# Patient Record
Sex: FEMALE | URBAN - METROPOLITAN AREA
[De-identification: names, ages, dates, MRNs, and addresses within clinical notes are randomized per-mention and may not be internally consistent; named-entity substitution may affect disease eponyms.]

---

## 2022-08-01 ENCOUNTER — OFFICE VISIT (OUTPATIENT)
Dept: PAIN MANAGEMENT | Age: 42
End: 2022-08-01
Payer: COMMERCIAL

## 2022-08-01 DIAGNOSIS — M79.672 BILATERAL FOOT PAIN: Primary | ICD-10-CM

## 2022-08-01 DIAGNOSIS — M79.671 BILATERAL FOOT PAIN: Primary | ICD-10-CM

## 2022-08-01 PROCEDURE — 95911 NRV CNDJ TEST 9-10 STUDIES: CPT | Performed by: PHYSICAL MEDICINE & REHABILITATION

## 2022-08-01 PROCEDURE — 95886 MUSC TEST DONE W/N TEST COMP: CPT | Performed by: PHYSICAL MEDICINE & REHABILITATION

## 2022-08-01 NOTE — PROGRESS NOTES
Electromyography (EMG)/Nerve conduction studies (NCS) Report: Lower Extremity    Name: Diego Lara   YOB: 1980  Date of Service: 8/1/2022   Provider: Jessica Dsouza MD        INDICATIONS:  Diego Lara is a 36 y.o. female who presents for electrodiagnostic evaluation for neuropathy of foot by request of Dr. Claude Farrell. Her main symptom is bilateral foot pain. She denies any history of diabetes mellitus, thyroid disease, heavy alcohol use, liver or kidney problems, or history of chemotherapy. Both limbs are necessary to examine in order to evaluate for any evidence of systemic disease as well as establish normal baseline values from which to compare any abnormal unilateral findings. The study is explained and verbal consent to proceed is obtained. NERVE CONDUCTION STUDIES:    Sensory nerve conduction studies: Bilateral sural and superficial peroneal sensory nerve conduction studies demonstrate normal peak latencies and amplitudes. Waveforms are reproducible and good quality in all studies. Bilateral lower limb temperatures are normal.     Motor nerve conduction studies: Bilateral peroneal motor nerve conduction studies with pickup over the extensor digitorum brevis demonstrate normal distal latencies and amplitudes. Bilateral tibial motor nerve conduction studies with pickup over the abductor hallucis demonstrate normal distal latencies and amplitudes. H reflex: Bilateral H reflexes are difficult to elicit. ELECTROMYOGRAPHY: This study is limited due to patient tolerance. A disposable monopolar needle is used to evaluate bilateral vastus medialis, tibialis anterior, extensor hallucis longus, flexor digitorum longus, peroneus longus, medial gastrocnemius, and lateral gastrocnemius. Tolerance limits sampling in the left medial gastrocnemius, left lateral gastrocnemius, and right vastus medialis.   All of the muscles sampled are free of any clear abnormal spontaneous

## 2023-06-30 DIAGNOSIS — M19.90 ARTHRITIS: Primary | ICD-10-CM

## 2023-06-30 PROBLEM — M54.50 LOWER BACK PAIN: Status: ACTIVE | Noted: 2023-06-30

## 2023-06-30 PROBLEM — M13.0 INFLAMMATION OF MULTIPLE JOINTS: Status: ACTIVE | Noted: 2023-06-30

## 2023-06-30 PROBLEM — D75.839 THROMBOCYTOSIS: Status: ACTIVE | Noted: 2023-06-30

## 2023-06-30 PROBLEM — G62.9 NEUROPATHY: Status: ACTIVE | Noted: 2023-06-30

## 2023-06-30 PROBLEM — F41.0 PANIC ANXIETY SYNDROME: Status: ACTIVE | Noted: 2023-06-30

## 2023-06-30 PROBLEM — R19.7 DIARRHEA, UNSPECIFIED: Status: ACTIVE | Noted: 2023-06-30

## 2023-06-30 PROBLEM — G43.909 MIGRAINE: Status: ACTIVE | Noted: 2023-06-30

## 2023-06-30 PROBLEM — E55.9 VITAMIN D DEFICIENCY: Status: ACTIVE | Noted: 2023-06-30

## 2023-06-30 PROBLEM — G89.29 CHRONIC INTRACTABLE HEADACHE: Status: ACTIVE | Noted: 2023-06-30

## 2023-06-30 PROBLEM — H05.20 OCULAR PROPTOSIS: Status: ACTIVE | Noted: 2023-06-30

## 2023-06-30 PROBLEM — B34.9 NONSPECIFIC SYNDROME SUGGESTIVE OF VIRAL ILLNESS: Status: ACTIVE | Noted: 2023-06-30

## 2023-06-30 PROBLEM — R20.2 PINS AND NEEDLES SENSATION: Status: ACTIVE | Noted: 2023-06-30

## 2023-06-30 PROBLEM — D64.9 ANEMIA: Status: ACTIVE | Noted: 2023-06-30

## 2023-06-30 PROBLEM — R51.9 CHRONIC INTRACTABLE HEADACHE: Status: ACTIVE | Noted: 2023-06-30

## 2023-06-30 PROBLEM — M79.672 LEFT FOOT PAIN: Status: ACTIVE | Noted: 2023-06-30

## 2023-06-30 PROBLEM — M54.2 NECK PAIN: Status: ACTIVE | Noted: 2023-06-30

## 2023-06-30 PROBLEM — R20.2 PARESTHESIA: Status: ACTIVE | Noted: 2023-06-30

## 2023-06-30 PROBLEM — H53.9 VISUAL DISTURBANCE: Status: ACTIVE | Noted: 2023-06-30

## 2023-06-30 PROBLEM — Z20.822 SUSPECTED COVID-19 VIRUS INFECTION: Status: ACTIVE | Noted: 2023-06-30

## 2023-06-30 PROBLEM — S93.609A FOOT SPRAIN: Status: ACTIVE | Noted: 2023-06-30

## 2023-06-30 PROBLEM — H65.92 LEFT SEROUS OTITIS MEDIA: Status: ACTIVE | Noted: 2023-06-30

## 2023-06-30 PROBLEM — M79.642 PAIN IN BOTH HANDS: Status: ACTIVE | Noted: 2023-06-30

## 2023-06-30 PROBLEM — M79.641 PAIN IN BOTH HANDS: Status: ACTIVE | Noted: 2023-06-30

## 2023-06-30 PROBLEM — G43.009 MIGRAINE WITHOUT AURA AND WITHOUT STATUS MIGRAINOSUS, NOT INTRACTABLE: Status: ACTIVE | Noted: 2023-06-30

## 2023-06-30 PROBLEM — E53.8 LOW SERUM VITAMIN B12: Status: ACTIVE | Noted: 2023-06-30

## 2023-06-30 PROBLEM — G57.90 NEUROPATHY OF FOOT: Status: ACTIVE | Noted: 2023-06-30

## 2023-06-30 PROBLEM — R06.02 SHORTNESS OF BREATH: Status: ACTIVE | Noted: 2023-06-30

## 2023-06-30 PROBLEM — R10.84 ABDOMINAL CRAMPING, GENERALIZED: Status: ACTIVE | Noted: 2023-06-30

## 2023-06-30 PROBLEM — R90.89 ABNORMAL BRAIN MRI: Status: ACTIVE | Noted: 2023-06-30

## 2023-06-30 RX ORDER — DOXYCYCLINE HYCLATE 100 MG
100 TABLET ORAL EVERY 12 HOURS
COMMUNITY
Start: 2023-01-23 | End: 2024-04-18 | Stop reason: ALTCHOICE

## 2023-06-30 RX ORDER — RIZATRIPTAN BENZOATE 10 MG/1
TABLET, ORALLY DISINTEGRATING ORAL
COMMUNITY
Start: 2022-10-16 | End: 2024-02-19 | Stop reason: WASHOUT

## 2023-06-30 RX ORDER — FLUTICASONE PROPIONATE 50 MCG
2 SPRAY, SUSPENSION (ML) NASAL DAILY
COMMUNITY
Start: 2022-05-31 | End: 2024-02-19 | Stop reason: WASHOUT

## 2023-06-30 RX ORDER — ALLOPURINOL 100 MG/1
100 TABLET ORAL 3 TIMES DAILY
COMMUNITY
Start: 2022-11-15 | End: 2024-02-19 | Stop reason: WASHOUT

## 2023-06-30 RX ORDER — IBUPROFEN 800 MG/1
800 TABLET ORAL 2 TIMES DAILY
COMMUNITY
End: 2024-02-19 | Stop reason: WASHOUT

## 2023-06-30 RX ORDER — IBUPROFEN 800 MG/1
TABLET ORAL
Qty: 60 TABLET | Refills: 0 | Status: SHIPPED | OUTPATIENT
Start: 2023-06-30 | End: 2024-04-18 | Stop reason: SDUPTHER

## 2023-06-30 RX ORDER — CHOLECALCIFEROL (VITAMIN D3) 50 MCG
TABLET ORAL
COMMUNITY
End: 2024-04-18 | Stop reason: SDUPTHER

## 2023-06-30 NOTE — TELEPHONE ENCOUNTER
Please schedule an appointment for medicine is gone have sent in 30-day supply.  In the event that you cannot get in before this 30-day supply please call my office.Thanks much Dr. Lopez.

## 2023-07-11 LAB — COBALAMIN (VITAMIN B12) (PG/ML) IN SER/PLAS: 925 PG/ML (ref 211–911)

## 2023-10-31 ENCOUNTER — TELEPHONE (OUTPATIENT)
Dept: NEUROLOGY | Facility: CLINIC | Age: 43
End: 2023-10-31
Payer: COMMERCIAL

## 2023-10-31 NOTE — TELEPHONE ENCOUNTER
Patient called in requesting results from skin bx.    She is also inquiring about her paperwork (I did confirm that we received it and that it was on your desk). She is wanting to know if she can have the paperwork as soon as possible because she was supposed to turn it in last month but we did not receive it at that time. Thank you!

## 2023-11-16 ENCOUNTER — OFFICE VISIT (OUTPATIENT)
Dept: NEUROLOGY | Facility: CLINIC | Age: 43
End: 2023-11-16
Payer: COMMERCIAL

## 2023-11-16 DIAGNOSIS — G43.809 OTHER MIGRAINE WITHOUT STATUS MIGRAINOSUS, NOT INTRACTABLE: Primary | ICD-10-CM

## 2023-11-16 DIAGNOSIS — M79.7 FIBROMYALGIA: ICD-10-CM

## 2023-11-16 PROCEDURE — 99214 OFFICE O/P EST MOD 30 MIN: CPT | Performed by: PSYCHIATRY & NEUROLOGY

## 2023-11-16 RX ORDER — TOPIRAMATE 50 MG/1
TABLET, FILM COATED ORAL
Qty: 60 TABLET | Refills: 2 | Status: SHIPPED | OUTPATIENT
Start: 2023-11-16 | End: 2024-02-05 | Stop reason: SDUPTHER

## 2023-11-16 ASSESSMENT — PATIENT HEALTH QUESTIONNAIRE - PHQ9
1. LITTLE INTEREST OR PLEASURE IN DOING THINGS: NOT AT ALL
SUM OF ALL RESPONSES TO PHQ9 QUESTIONS 1 AND 2: 0
2. FEELING DOWN, DEPRESSED OR HOPELESS: NOT AT ALL

## 2023-11-16 ASSESSMENT — ENCOUNTER SYMPTOMS
LOSS OF SENSATION IN FEET: 0
OCCASIONAL FEELINGS OF UNSTEADINESS: 0

## 2023-11-16 ASSESSMENT — COLUMBIA-SUICIDE SEVERITY RATING SCALE - C-SSRS
1. IN THE PAST MONTH, HAVE YOU WISHED YOU WERE DEAD OR WISHED YOU COULD GO TO SLEEP AND NOT WAKE UP?: NO
6. HAVE YOU EVER DONE ANYTHING, STARTED TO DO ANYTHING, OR PREPARED TO DO ANYTHING TO END YOUR LIFE?: NO
2. HAVE YOU ACTUALLY HAD ANY THOUGHTS OF KILLING YOURSELF?: NO

## 2023-11-16 NOTE — PROGRESS NOTES
Chief Complaint: Paresthesias    HPI  43 y.o. female presenting for follow up regarding paresthesias.    Since the last visit, she continues to note diffuse aching pain throughout her body.  She also notes burning or stabbing pains from the waist on downward.  Her feet feel like she is walking on broken glass.  She notes a diffuse spasm in her back.      She notes headaches about 2-3 per week.  She describes a throbbing/pressure pain either behind her eyes or in the back of her head.  She has associated photophobia, phonophobia, nausea, and vomiting.  She used to take Rizatriptan which did break her migraine.      She has tried Cymbalta which did not help.    She has tried Amitriptyine which caused to feel drowsy.        Current Outpatient Medications:     allopurinol (Zyloprim) 100 mg tablet, Take 1 tablet (100 mg) by mouth 3 times a day., Disp: , Rfl:     doxycycline (Vibra-Tabs) 100 mg tablet, Take 1 tablet (100 mg) by mouth every 12 hours., Disp: , Rfl:     fluticasone (Flonase) 50 mcg/actuation nasal spray, Administer 2 sprays into affected nostril(s) once daily., Disp: , Rfl:     ibuprofen 800 mg tablet, Take 1 tablet by mouth twice daily, Disp: 60 tablet, Rfl: 0    ibuprofen 800 mg tablet, Take 1 tablet (800 mg) by mouth 2 times a day., Disp: , Rfl:     rizatriptan MLT (Maxalt-MLT) 10 mg disintegrating tablet, dissolve 1 tablet ON TONGUE AT ONSET OF HEADACHE may repeat in 2 ...  (REFER TO PRESCRIPTION NOTES)., Disp: , Rfl:     Vitamin B-12 2,500 mcg tablet, sublingual, DISSOLVE 1 TABLET ONCE DAILY, Disp: , Rfl:       Objective:    Gen: NAD  Neuro:  --HIF: A&O X 3, repetition and naming intact  --CN:  PERRLA, EOMI, VFF, no visible facial asymmetry, facial sensation intact, no tongue or palatal deviation, SCM intact  --Motor: Moves all 4 extremities equally; no focal deficits  --Sensory: Intact to light touch, intact to pinprick  --Reflex: 2+ symmetric, toes down  --Cerebellum: FTN and HTS intact  --Gait:  Normal, narrow based gait    Relevant Results  Skin  Biopsy: Negative    Assessment:    Chronic Migraine  - current migraine frequency is 2-3 days/week  - she has tried Amitriptyline, Cymbalta, and Gabapentin without any improvement  - start Topamax and titrate to 50 mg BID (reviewed side effects; patient instructed to avoid pregnancy given increased risk of fetal malformation)  - keep migraine diary    2.  Fibromyalgia  - will try Topamax as above    Follow up in 3 months,      Santosh Pham MD  [facility]  Department of Neurology

## 2023-12-28 ENCOUNTER — TELEPHONE (OUTPATIENT)
Dept: NEUROLOGY | Facility: CLINIC | Age: 43
End: 2023-12-28
Payer: COMMERCIAL

## 2023-12-28 NOTE — LETTER
To Whom It May Concern,    I have seen and evaluated Bernohemy Plata.    Based on my evaluation, the patient should be allowed to sit down on an as needed basis during her 8 hour shift.  Please let me know if you have any questions.    Thank You,    Santosh Pham MD  [facility]  Department of Neurology

## 2023-12-28 NOTE — TELEPHONE ENCOUNTER
----- Message from Caren Henderson MA sent at 12/28/2023 10:35 AM EST -----  Patient would like to know if you can rewrite her letter for work that instead of saying she needs to sit down the entire 8hours for it to say as needed through out the 8 hours

## 2024-02-05 DIAGNOSIS — G43.809 OTHER MIGRAINE WITHOUT STATUS MIGRAINOSUS, NOT INTRACTABLE: ICD-10-CM

## 2024-02-05 RX ORDER — TOPIRAMATE 50 MG/1
TABLET, FILM COATED ORAL
Qty: 60 TABLET | Refills: 2 | Status: SHIPPED | OUTPATIENT
Start: 2024-02-05 | End: 2024-02-19 | Stop reason: SDUPTHER

## 2024-02-06 ENCOUNTER — APPOINTMENT (OUTPATIENT)
Dept: RHEUMATOLOGY | Facility: CLINIC | Age: 44
End: 2024-02-06
Payer: COMMERCIAL

## 2024-02-19 ENCOUNTER — OFFICE VISIT (OUTPATIENT)
Dept: NEUROLOGY | Facility: CLINIC | Age: 44
End: 2024-02-19
Payer: COMMERCIAL

## 2024-02-19 VITALS
BODY MASS INDEX: 25.02 KG/M2 | TEMPERATURE: 96.8 F | SYSTOLIC BLOOD PRESSURE: 120 MMHG | WEIGHT: 155.7 LBS | HEIGHT: 66 IN | HEART RATE: 105 BPM | DIASTOLIC BLOOD PRESSURE: 82 MMHG

## 2024-02-19 DIAGNOSIS — G43.809 OTHER MIGRAINE WITHOUT STATUS MIGRAINOSUS, NOT INTRACTABLE: ICD-10-CM

## 2024-02-19 DIAGNOSIS — M79.7 FIBROMYALGIA: Primary | ICD-10-CM

## 2024-02-19 PROCEDURE — 99214 OFFICE O/P EST MOD 30 MIN: CPT | Performed by: PSYCHIATRY & NEUROLOGY

## 2024-02-19 RX ORDER — TOPIRAMATE 50 MG/1
TABLET, FILM COATED ORAL
Qty: 90 TABLET | Refills: 3 | Status: SHIPPED | OUTPATIENT
Start: 2024-02-19

## 2024-02-19 RX ORDER — DULOXETIN HYDROCHLORIDE 30 MG/1
30 CAPSULE, DELAYED RELEASE ORAL DAILY
COMMUNITY
Start: 2023-11-24 | End: 2024-04-18 | Stop reason: ALTCHOICE

## 2024-02-19 ASSESSMENT — LIFESTYLE VARIABLES
AUDIT-C TOTAL SCORE: 0
HOW OFTEN DO YOU HAVE SIX OR MORE DRINKS ON ONE OCCASION: NEVER
SKIP TO QUESTIONS 9-10: 1
HOW OFTEN DO YOU HAVE A DRINK CONTAINING ALCOHOL: NEVER
HOW MANY STANDARD DRINKS CONTAINING ALCOHOL DO YOU HAVE ON A TYPICAL DAY: PATIENT DOES NOT DRINK

## 2024-02-19 ASSESSMENT — PATIENT HEALTH QUESTIONNAIRE - PHQ9
2. FEELING DOWN, DEPRESSED OR HOPELESS: NOT AT ALL
SUM OF ALL RESPONSES TO PHQ9 QUESTIONS 1 & 2: 0
1. LITTLE INTEREST OR PLEASURE IN DOING THINGS: NOT AT ALL

## 2024-02-19 NOTE — PROGRESS NOTES
"Chief Complaint: Migraines, Fibromyalgia    HPI  44 y.o. female presenting for follow up regarding above.    Since the last visit, she has been doing well. Her migraines are about once per week.  She also noted an improvement in her fibromyalgia symptoms.  Two weeks ago, she ate red meat.  She noted worsening diffuse pain  She notes a shooting pain in her feet, legs, back, arms.  She denies any side effects from Topamax.        Current Outpatient Medications:     doxycycline (Vibra-Tabs) 100 mg tablet, Take 1 tablet (100 mg) by mouth every 12 hours., Disp: , Rfl:     DULoxetine (Cymbalta) 30 mg DR capsule, Take 1 capsule (30 mg) by mouth once daily., Disp: , Rfl:     Vitamin B-12 2,500 mcg tablet, sublingual, DISSOLVE 1 TABLET ONCE DAILY, Disp: , Rfl:     ibuprofen 800 mg tablet, Take 1 tablet by mouth twice daily, Disp: 60 tablet, Rfl: 0    topiramate (Topamax) 50 mg tablet, Week 1: 25 mg/day.  Week 2:  25 mg BID.  Week 3:  25 mg in the morning, 50 mg in the evening.  Week 4 and beyond:  50 mg twice per day., Disp: 60 tablet, Rfl: 2      Objective:  /82 (BP Location: Right arm, Patient Position: Sitting, BP Cuff Size: Adult)   Pulse 105   Temp 36 °C (96.8 °F) (Temporal)   Ht 1.676 m (5' 6\")   Wt 70.6 kg (155 lb 11.2 oz)   LMP  (LMP Unknown)   BMI 25.13 kg/m²     Gen: NAD  Neuro:  --HIF: A&O X 3, repetition and naming intact  --CN:  PERRLA, EOMI, VFF, no visible facial asymmetry, facial sensation intact, no tongue or palatal deviation, SCM intact  --Motor: Moves all 4 extremities equally; no focal deficits  --Sensory: Intact to light touch, intact to pinprick  --Reflex: 2+ symmetric, toes down  --Cerebellum: FTN and HTS intact  --Gait: Normal, narrow based gait    Relevant Results      Assessment:    Chronic Migraines  - improved since starting Topamax  - current migraine frequency is up to once per week  - recommend increasing Topamax to 75 mg BID    2.  Fibromyalgia  - worse in the last 2 weeks  - " recommend increasing Topamax to 75 mg BID  - exercise daily    Follow up in 6 months      Santosh Pham MD  [facility]  Department of Neurology

## 2024-04-18 ENCOUNTER — OFFICE VISIT (OUTPATIENT)
Dept: PRIMARY CARE | Facility: CLINIC | Age: 44
End: 2024-04-18
Payer: COMMERCIAL

## 2024-04-18 ENCOUNTER — TELEPHONE (OUTPATIENT)
Dept: PRIMARY CARE | Facility: CLINIC | Age: 44
End: 2024-04-18

## 2024-04-18 VITALS
HEART RATE: 88 BPM | HEIGHT: 66 IN | RESPIRATION RATE: 18 BRPM | TEMPERATURE: 97 F | OXYGEN SATURATION: 96 % | DIASTOLIC BLOOD PRESSURE: 82 MMHG | BODY MASS INDEX: 25.71 KG/M2 | SYSTOLIC BLOOD PRESSURE: 130 MMHG | WEIGHT: 160 LBS

## 2024-04-18 DIAGNOSIS — M19.90 ARTHRITIS: ICD-10-CM

## 2024-04-18 DIAGNOSIS — E55.9 VITAMIN D DEFICIENCY: ICD-10-CM

## 2024-04-18 DIAGNOSIS — R55 SYNCOPE, UNSPECIFIED SYNCOPE TYPE: ICD-10-CM

## 2024-04-18 DIAGNOSIS — E53.8 LOW SERUM VITAMIN B12: Primary | ICD-10-CM

## 2024-04-18 DIAGNOSIS — J01.90 ACUTE RHINOSINUSITIS: ICD-10-CM

## 2024-04-18 DIAGNOSIS — K21.00 GASTROESOPHAGEAL REFLUX DISEASE WITH ESOPHAGITIS, UNSPECIFIED WHETHER HEMORRHAGE: ICD-10-CM

## 2024-04-18 PROBLEM — U07.1 COVID-19: Status: ACTIVE | Noted: 2021-12-27

## 2024-04-18 PROBLEM — S46.819A STRAIN OF TRAPEZIUS MUSCLE: Status: ACTIVE | Noted: 2019-06-18

## 2024-04-18 PROCEDURE — 99214 OFFICE O/P EST MOD 30 MIN: CPT | Performed by: FAMILY MEDICINE

## 2024-04-18 PROCEDURE — 93000 ELECTROCARDIOGRAM COMPLETE: CPT | Performed by: FAMILY MEDICINE

## 2024-04-18 RX ORDER — IBUPROFEN 800 MG/1
800 TABLET ORAL 2 TIMES DAILY
Qty: 60 TABLET | Refills: 0 | Status: SHIPPED | OUTPATIENT
Start: 2024-04-18

## 2024-04-18 RX ORDER — PREGABALIN 50 MG/1
CAPSULE ORAL
COMMUNITY

## 2024-04-18 RX ORDER — FLUTICASONE PROPIONATE 50 MCG
2 SPRAY, SUSPENSION (ML) NASAL DAILY
Qty: 16 G | Refills: 11 | Status: SHIPPED | OUTPATIENT
Start: 2024-04-18 | End: 2025-04-18

## 2024-04-18 RX ORDER — OMEPRAZOLE 40 MG/1
40 CAPSULE, DELAYED RELEASE ORAL
Qty: 30 CAPSULE | Refills: 11 | Status: SHIPPED | OUTPATIENT
Start: 2024-04-18 | End: 2024-05-18

## 2024-04-18 RX ORDER — CHOLECALCIFEROL (VITAMIN D3) 50 MCG
TABLET ORAL
Qty: 90 TABLET | Refills: 3 | Status: SHIPPED | OUTPATIENT
Start: 2024-04-18

## 2024-04-18 RX ORDER — LORATADINE 10 MG/1
10 TABLET ORAL DAILY
Qty: 30 TABLET | Refills: 2 | Status: SHIPPED | OUTPATIENT
Start: 2024-04-18 | End: 2024-07-17

## 2024-04-18 NOTE — ASSESSMENT & PLAN NOTE
v going to the bathroom when she passed  out felt    lighthheaded andc  dizzy and shortness of breath        had   pain left  leg

## 2024-04-18 NOTE — ASSESSMENT & PLAN NOTE
The reflux esophagitis he has been occurring in a recurrent pattern for years. The course has been without change. The reflux is described as mild to moderate. The patient remains compliant on meds.. The patient takes medications in daily fashion. Denies dysphagia hoarseness or odynophagia... Uncontrolled discussed with patient encouraged to try omeprazole 40 mg daily in addition watch coffee cola chocolate tea avoid eating prior to bedtime head of bed elevated at night for two 4 inch blocks under the headboard works and monitor.  Will refer for EGD check labs discussed medication and side effects

## 2024-04-18 NOTE — LETTER
April 18, 2024     Patient: Jason Plata   YOB: 1980   Date of Visit: 4/18/2024       To Whom It May Concern:    Jason Plata was seen in my clinic on 4/18/2024?. Please excuse Jason for her absence from work on this day to make the appointment.    If you have any questions or concerns, please don't hesitate to call.         Sincerely,         Stuart Lopez, DO

## 2024-04-18 NOTE — ASSESSMENT & PLAN NOTE
Patient seen on clinical chronic on/9/24 according to the medical records she was having ongoing sinus issues for about a month with COVID 2 years.  According to records she stood up to go to the bathroom felt lightheaded and passed out before chest pains at the time.    Examination shows congestion in the nose may be allergies could try loratadine 10 mg daily and fluticasone nasal spray see if this helps

## 2024-04-18 NOTE — PROGRESS NOTES
Subjective   Patient ID: Jason Plata is a 44 y.o. female who presents for Sinusitis (Declined swabs).  HPI  Patient seen on clinical chronic on/9/24 according to the medical records she was having ongoing sinus issues for about a month with COVID 2 years.  According to records she stood up to go to the bathroom felt lightheaded and passed out before chest pains at the time.  Patient had beginning of workup without left emergency department after short period time.  I.e. 4 hours  ?? Sinus   infection .. Using inhaler  and helping    . Sometimes     with inhaler use helps    The reflux esophagitis he has been occurring in a recurrent pattern for years. The course has been without change. The reflux is described as mild to moderate. The patient remains compliant on meds.. The patient takes medications in daily fashion. Denies dysphagia hoarseness or odynophagia...     Going to  pain mangement at [facility] and fibromyalgia  Review of Systems  cardiovascular:  no  palpitations or chest  pain  respiratory: sometimes  shortness  of  breath  endocrine:  no polydipsia,  no polyuria  musculoskeletal:  no  myalgia.. no arthralgia  All other  systems discussed  negative   Objective   Physical Exam  Vitals: I have reviewed the vitals  General: Well-developed.  In no acute distress.  Eyes:   sclera nonicteric.  Conjunctiva not injected.  No discharge.   HEAD: Normocephalic, atraumatic.  HEENT   Mucous membranes moist.  Posterior oropharynx nonerythematous, no tonsillar exudates.     Nose congested  No cervical lymphadenopathy.  Cardio: Regular rate and rhythm.  No murmur, rub or gallop.  Pulmonary: Lungs clear to auscultation in all fields.  No accessory muscle use.  GI/: Normal active bowel sounds.  Soft, nontender.  No masses or organomegaly appreciated.  Musculoskeletal: No gross deformities appreciated.  Neuro: Alert, age-appropriate.  Normal muscle tone.  Moving all extremities.  Skin: No rash, bruises or  lesions.   Labs      [facility]  Outside Information      suggestion  Information displayed in this report may not trend or trigger automated decision support.      Contains abnormal data CBC + DIFF  Order: [id number]  Component  Ref Range & Units 9 d ago   WBC  3.70 - 11.00 k/uL 10.96   RBC  3.90 - 5.20 m/uL 4.40   Hemoglobin  11.5 - 15.5 g/dL 12.1   Hematocrit  36.0 - 46.0 % 38.3   MCV  80.0 - 100.0 fL 87.0   MCH  26.0 - 34.0 pg 27.5   MCHC  30.5 - 36.0 g/dL 31.6   RDW-CV  11.5 - 15.0 % 15.0   Platelet Count  150 - 400 k/uL 419 High    MPV  9.0 - 12.7 fL 8.9 Low    Neutrophils %  % 59.0   Abs Neut  1.45 - 7.50 k/uL 6.46   Lymphocytes %  % 28.2   Abs Lymph  1.00 - 4.00 k/uL 3.09   Monocytes %  % 9.3   Abs Mono  <0.87 k/uL 1.02 High    Eosinophils %  % 2.2   Abs Eosin  <0.46 k/uL 0.24   Basophils %  % 0.6   Abs Baso  <0.11 k/uL 0.07   Immature Granulocytes %  % 0.7   Abs Immature Gran  <0.10 k/uL 0.08   NRBC  /100 WBC 0.0   Absolute nRBC  <0.01 k/uL <0.01   Diff Type Auto       [facility]  Outside Information      suggestion  Information displayed in this report may not trend or trigger automated decision support.      Contains abnormal data COMP METABOLIC PANEL  Order: [id number]  Component  Ref Range & Units 9 d ago   Protein, Total  6.3 - 8.0 g/dL 8.2 High    Albumin  3.9 - 4.9 g/dL 4.2   Calcium, Total  8.5 - 10.2 mg/dL 9.4   Bilirubin, Total  0.2 - 1.3 mg/dL <0.2 Low    Alkaline Phosphatase  34 - 123 U/L 82   AST  13 - 35 U/L 20   ALT  7 - 38 U/L 18   Glucose  74 - 99 mg/dL 96   Comment: The American Diabetes Association (ADA) provides guidance for cutoff values for fasting glucose and random glucose. The ADA defines fasting as no caloric intake for at least 8 hours. Fasting plasma glucose results between 100 to 125 mg/dL indicate increased risk for diabetes (prediabetes).  Fasting plasma glucose results greater than or equal to 126 mg/dL meet the criteria for diagnosis of diabetes. In the absence of  unequivocal hyperglycemia, results should be confirmed by repeat testing. In a patient with classic symptoms of hyperglycemia or hyperglycemic crisis, random plasma glucose results greater than or equal to 200 mg/dL meet the criteria for diagnosis of diabetes.  Reference: Standards of Medical Care in Diabetes 2016, American Diabetes Association. Diabetes Care. 2016.39(Suppl 1).   BUN  7 - 21 mg/dL 8   Creatinine  0.58 - 0.96 mg/dL 0.84   Sodium  136 - 144 mmol/L 139   Potassium  3.7 - 5.1 mmol/L 4.1   Chloride  97 - 105 mmol/L 106 High    CO2  22 - 30 mmol/L 21 Low    Anion Gap  9 - 18 mmol/L 12   Estimated Glomerular Filtration Rate  >=60 mL/min/1.73m² 87   Comment: Estimated Glomerular Filtration Rate (eGFR) is calculated using the 2021 CKD-EPI creatinine equation. This equation utilizes serum creatinine, sex, and age as parameters. The creatinine assay has traceable calibration to isotope dilution-mass spectrometry. Refer to KDIGO guidelines for clinical interpretation. In patients with unstable renal function, e.g. those with acute kidney injury, the eGFR may not accurately reflect actual GFR.   Resulting Agency [facility] LABOR[facility]   MAGNESIUM BLD  Order: [id number]  Component  Ref Range & Units 9 d ago   Magnesium  1.7 - 2.3 mg/dL 2.0   Resulting [facility]     Specimen Collected: 04/09/24 13:42    Performed by: [facility] Last Resulted: 04/09/24 14:30   Received From: [facility]  Result Received: 04/18/24 08:44    View Encounter        Received Information   [facility]  Outside Information      suggestion  Information displayed in this report may not trend or trigger automated decision support.     HIGH SENSITIVITY TROPONIN T (INITIAL)  Order: [id number]  Component  Ref Range & Units 9 d ago   LEE High Sensitivity  <12 ng/L 7         Assessment/Plan   Problem List Items Addressed This Visit       Vitamin D deficiency      vitamin D vitamin D 2000 IUs daily          Relevant Orders    Vitamin D 25-Hydroxy,Total (for eval of Vitamin D levels)    Low serum vitamin B12 - Primary    Relevant Medications    Vitamin B-12 2,500 mcg tablet, sublingual SL tablet    Acute rhinosinusitis     Patient seen on clinical chronic on/9/24 according to the medical records she was having ongoing sinus issues for about a month with COVID 2 years.  According to records she stood up to go to the bathroom felt lightheaded and passed out before chest pains at the time.    Examination shows congestion in the nose may be allergies could try loratadine 10 mg daily and fluticasone nasal spray see if this helps         Relevant Medications    fluticasone (Flonase) 50 mcg/actuation nasal spray    loratadine (Claritin) 10 mg tablet    Reflux esophagitis      The reflux esophagitis he has been occurring in a recurrent pattern for years. The course has been without change. The reflux is described as mild to moderate. The patient remains compliant on meds.. The patient takes medications in daily fashion. Denies dysphagia hoarseness or odynophagia... Uncontrolled discussed with patient encouraged to try omeprazole 40 mg daily in addition watch coffee cola chocolate tea avoid eating prior to bedtime head of bed elevated at night for two 4 inch blocks under the headboard works and monitor.  Will refer for EGD check labs discussed medication and side effects         Relevant Medications    omeprazole (PriLOSEC) 40 mg DR capsule     Other Visit Diagnoses       Arthritis        Relevant Medications    ibuprofen 800 mg tablet    Syncope, unspecified syncope type        Relevant Orders    ECG 12 Lead    Referral to Cardiology        EKG showed normal sinus rhythm

## 2024-04-19 NOTE — PATIENT INSTRUCTIONS

## 2024-04-23 ENCOUNTER — LAB (OUTPATIENT)
Dept: LAB | Facility: LAB | Age: 44
End: 2024-04-23
Payer: COMMERCIAL

## 2024-04-23 DIAGNOSIS — E55.9 VITAMIN D DEFICIENCY: ICD-10-CM

## 2024-04-23 LAB — 25(OH)D3 SERPL-MCNC: 37 NG/ML (ref 30–100)

## 2024-04-23 PROCEDURE — 36415 COLL VENOUS BLD VENIPUNCTURE: CPT

## 2024-04-23 PROCEDURE — 82306 VITAMIN D 25 HYDROXY: CPT

## 2024-05-01 ENCOUNTER — OFFICE VISIT (OUTPATIENT)
Dept: CARDIOLOGY | Facility: CLINIC | Age: 44
End: 2024-05-01
Payer: COMMERCIAL

## 2024-05-01 VITALS
HEART RATE: 76 BPM | HEIGHT: 67 IN | BODY MASS INDEX: 25.62 KG/M2 | DIASTOLIC BLOOD PRESSURE: 70 MMHG | SYSTOLIC BLOOD PRESSURE: 128 MMHG | WEIGHT: 163.2 LBS

## 2024-05-01 DIAGNOSIS — R07.89 OTHER CHEST PAIN: ICD-10-CM

## 2024-05-01 DIAGNOSIS — Z82.49 FAMILY HISTORY OF ISCHEMIC HEART DISEASE: ICD-10-CM

## 2024-05-01 DIAGNOSIS — R00.2 PALPITATIONS: ICD-10-CM

## 2024-05-01 DIAGNOSIS — R06.09 DYSPNEA ON EXERTION: ICD-10-CM

## 2024-05-01 DIAGNOSIS — R55 VASOVAGAL SYNCOPE: ICD-10-CM

## 2024-05-01 DIAGNOSIS — R55 SYNCOPE, UNSPECIFIED SYNCOPE TYPE: ICD-10-CM

## 2024-05-01 DIAGNOSIS — F17.200 CURRENT SMOKER ON SOME DAYS: ICD-10-CM

## 2024-05-01 PROCEDURE — 93000 ELECTROCARDIOGRAM COMPLETE: CPT | Performed by: INTERNAL MEDICINE

## 2024-05-01 PROCEDURE — 3008F BODY MASS INDEX DOCD: CPT | Performed by: INTERNAL MEDICINE

## 2024-05-01 PROCEDURE — 99204 OFFICE O/P NEW MOD 45 MIN: CPT | Performed by: INTERNAL MEDICINE

## 2024-05-01 NOTE — PATIENT INSTRUCTIONS
You will be scheduled for a nuclear walking stress test, echocardiogram and 24 hour Holter monitor  Patient to follow up after testing.    Continue same medications/treatment.  Patient educated on proper medication use.  Patient educated on risk factor modification.  Please bring any lab results from other providers / physicians to your next appointment.    Please bring all medicines, vitamins and herbal supplements with you when you come to the office.    Prescriptions will not be filled unless you are compliant with your follow up appointments or have a follow up  appointment scheduled as per instruction of your physician.  Refills should be requested at the time of  Your visit.    Marta SUTTON LPN, am scribing for and in the presence of Dr. John Cohen MD, FACC

## 2024-05-01 NOTE — PROGRESS NOTES
"CARDIOLOGY OFFICE VISIT      CHIEF COMPLAINT  Chest discomfort    HISTORY OF PRESENT ILLNESS  The patient states that she went to be seen today because of recent chest discomfort.  Her chest discomfort started in March but is more bothersome recently.  The chest discomfort is anterior chest in location.  There is no radiation.  The patient states she has a tightness like feeling.  The episodes seem to last hours when she gets it.  She is very concerned about these episodes.  She states that there is a strong family history of cardiac problems particularly congestive heart failure.  One of her sisters has had cardiac surgery.  She does not know the exact time.  One of the family members apparently  from my \"heart attack\".  She does have a history of reflux esophagitis and fibromyalgia.  She does complain of dyspnea and fatigue with activities.  She also complains of heart racing and fluttering.  On the shoulder Eclyp[employer] day she did have a syncopal episode.  This sounds like is probably related to postural hypotension.  This is the only syncopal episode she has had.  She does smoke cigarettes.  She does not have any past history of hypertension, hyperlipidemia or diabetes mellitus.  She did have CBC and chemistry profile done approximately 3 weeks ago at [facility] and these are unremarkable.  I do not have any lipid profile on her.    EKG: Normal sinus rhythm, within normal limits, results discussed with patient    Impression:  Chest discomfort, evaluate for cardiac etiology  Dyspnea on exertion  Palpitations  1 recent syncopal episode, sounds like most likely secondary to postural hypotension  History of gastroesophageal reflux  History of fibromyalgia  Tobacco abuse  Positive family history of myocardial infarction  Positive family history of cardiac surgery, exact type not known    Please excuse any errors in grammar or translation related to this dictation.  Voice recognition software was utilized to prepare this " document.    Past Medical History  Past Medical History:   Diagnosis Date    Pain in left foot 04/24/2022    Left foot pain       Social History  Social History     Tobacco Use    Smoking status: Some Days     Current packs/day: 0.50     Types: Cigarettes     Passive exposure: Current    Smokeless tobacco: Not on file   Vaping Use    Vaping status: Never Used   Substance Use Topics    Alcohol use: Not Currently    Drug use: Never       Family History     Family History   Problem Relation Name Age of Onset    Diabetes Father          Allergies:  No Known Allergies     Outpatient Medications:  Current Outpatient Medications   Medication Instructions    fluticasone (Flonase) 50 mcg/actuation nasal spray 2 sprays, Each Nostril, Daily, Shake gently. Before first use, prime pump. After use, clean tip and replace cap.    ibuprofen 800 mg, oral, 2 times daily    loratadine (CLARITIN) 10 mg, oral, Daily    omeprazole (PRILOSEC) 40 mg, oral, Daily before breakfast, Do not crush or chew.    pregabalin (Lyrica) 50 mg capsule TAKE 1 CAPSULE BY MOUTH THREE TIMES DAILY FOR 90 DAYS    topiramate (Topamax) 50 mg tablet 75 mg BID    Vitamin B-12 2,500 mcg tablet, sublingual SL tablet DISSOLVE 1 TABLET ONCE DAILY          REVIEW OF SYSTEMS  Review of Systems   Cardiovascular:  Positive for chest pain.   All other systems reviewed and are negative.        VITALS  Vitals:    05/01/24 1244   BP: 128/70   Pulse: 76       PHYSICAL EXAM  Vitals and nursing note reviewed.   Constitutional:       Appearance: Healthy appearance.   Eyes:      Conjunctiva/sclera: Conjunctivae normal.      Pupils: Pupils are equal, round, and reactive to light.   Pulmonary:      Effort: Pulmonary effort is normal.      Breath sounds: Normal breath sounds.   Cardiovascular:      PMI at left midclavicular line. Normal rate. Regular rhythm.      Murmurs: There is no murmur.      No gallop.  No click. No rub.   Pulses:     Intact distal pulses.   Edema:      Peripheral edema absent.   Musculoskeletal: Normal range of motion. Skin:     General: Skin is warm and dry.   Neurological:      Mental Status: Alert and oriented to person, place and time.           ASSESSMENT AND PLAN  Diagnoses and all orders for this visit:  Syncope, unspecified syncope type  -     Referral to Cardiology  Other chest pain  Dyspnea on exertion  Vasovagal syncope  Palpitations  Family history of ischemic heart disease  BMI 25.0-25.9,adult  Current smoker on some days      [unfilled]

## 2024-05-09 ENCOUNTER — TELEPHONE (OUTPATIENT)
Dept: CARDIOLOGY | Facility: CLINIC | Age: 44
End: 2024-05-09
Payer: COMMERCIAL

## 2024-05-09 DIAGNOSIS — R06.09 DYSPNEA ON EXERTION: ICD-10-CM

## 2024-05-09 DIAGNOSIS — R07.89 OTHER CHEST PAIN: ICD-10-CM

## 2024-05-09 NOTE — TELEPHONE ENCOUNTER
----- Message from Paty Zaldivar sent at 5/9/2024  1:33 PM EDT -----  Regarding: MPL DENIED  MPL DENIED AFTER P2P COMPLETED. SUGGESTED GXT. PLEASE HAVE CDO ADVISE IF GXT NEEDED.    THANK YOU

## 2024-05-13 ENCOUNTER — ANCILLARY PROCEDURE (OUTPATIENT)
Dept: CARDIOLOGY | Facility: CLINIC | Age: 44
End: 2024-05-13
Payer: COMMERCIAL

## 2024-05-13 DIAGNOSIS — R55 SYNCOPE, UNSPECIFIED SYNCOPE TYPE: ICD-10-CM

## 2024-05-13 DIAGNOSIS — R00.2 PALPITATIONS: ICD-10-CM

## 2024-05-13 PROCEDURE — 93225 XTRNL ECG REC<48 HRS REC: CPT | Performed by: INTERNAL MEDICINE

## 2024-05-13 PROCEDURE — 93227 XTRNL ECG REC<48 HR R&I: CPT | Performed by: INTERNAL MEDICINE

## 2024-05-15 ENCOUNTER — APPOINTMENT (OUTPATIENT)
Dept: RADIOLOGY | Facility: CLINIC | Age: 44
End: 2024-05-15
Payer: COMMERCIAL

## 2024-05-15 ENCOUNTER — APPOINTMENT (OUTPATIENT)
Dept: CARDIOLOGY | Facility: CLINIC | Age: 44
End: 2024-05-15
Payer: COMMERCIAL

## 2024-05-17 ENCOUNTER — HOSPITAL ENCOUNTER (OUTPATIENT)
Dept: CARDIOLOGY | Facility: CLINIC | Age: 44
Discharge: HOME | End: 2024-05-17
Payer: COMMERCIAL

## 2024-05-17 VITALS
WEIGHT: 163 LBS | SYSTOLIC BLOOD PRESSURE: 110 MMHG | DIASTOLIC BLOOD PRESSURE: 60 MMHG | BODY MASS INDEX: 25.58 KG/M2 | HEIGHT: 67 IN

## 2024-05-17 DIAGNOSIS — R06.09 DYSPNEA ON EXERTION: ICD-10-CM

## 2024-05-17 DIAGNOSIS — R07.89 OTHER CHEST PAIN: ICD-10-CM

## 2024-05-17 DIAGNOSIS — R55 SYNCOPE, UNSPECIFIED SYNCOPE TYPE: ICD-10-CM

## 2024-05-17 DIAGNOSIS — R00.2 PALPITATIONS: ICD-10-CM

## 2024-05-17 LAB
AORTIC VALVE MEAN GRADIENT: 4 MMHG
AORTIC VALVE PEAK VELOCITY: 1.24 M/S
AV PEAK GRADIENT: 6.2 MMHG
AVA (PEAK VEL): 1.56 CM2
AVA (VTI): 1.56 CM2
EJECTION FRACTION APICAL 4 CHAMBER: 51.7
LEFT VENTRICLE INTERNAL DIMENSION DIASTOLE: 4.1 CM (ref 3.5–6)
LEFT VENTRICULAR OUTFLOW TRACT DIAMETER: 1.6 CM
LV EJECTION FRACTION BIPLANE: 51 %
MITRAL VALVE E/A RATIO: 1.27
MITRAL VALVE E/E' RATIO: 7.4
RIGHT VENTRICLE PEAK SYSTOLIC PRESSURE: 16.8 MMHG

## 2024-05-17 PROCEDURE — 93306 TTE W/DOPPLER COMPLETE: CPT | Performed by: INTERNAL MEDICINE

## 2024-05-17 PROCEDURE — 93306 TTE W/DOPPLER COMPLETE: CPT

## 2024-05-21 ENCOUNTER — HOSPITAL ENCOUNTER (OUTPATIENT)
Dept: CARDIOLOGY | Facility: CLINIC | Age: 44
Discharge: HOME | End: 2024-05-21
Payer: COMMERCIAL

## 2024-05-21 DIAGNOSIS — R06.00 DYSPNEA, UNSPECIFIED: ICD-10-CM

## 2024-05-21 DIAGNOSIS — R07.89 OTHER CHEST PAIN: ICD-10-CM

## 2024-05-21 DIAGNOSIS — R06.09 DYSPNEA ON EXERTION: ICD-10-CM

## 2024-05-21 PROCEDURE — 93017 CV STRESS TEST TRACING ONLY: CPT

## 2024-05-21 PROCEDURE — 93018 CV STRESS TEST I&R ONLY: CPT | Performed by: INTERNAL MEDICINE

## 2024-05-21 PROCEDURE — 93016 CV STRESS TEST SUPVJ ONLY: CPT | Performed by: INTERNAL MEDICINE

## 2024-05-28 ENCOUNTER — APPOINTMENT (OUTPATIENT)
Dept: CARDIOLOGY | Facility: CLINIC | Age: 44
End: 2024-05-28
Payer: COMMERCIAL

## 2024-05-29 ENCOUNTER — OFFICE VISIT (OUTPATIENT)
Dept: CARDIOLOGY | Facility: CLINIC | Age: 44
End: 2024-05-29
Payer: COMMERCIAL

## 2024-05-29 VITALS
DIASTOLIC BLOOD PRESSURE: 80 MMHG | BODY MASS INDEX: 25.47 KG/M2 | SYSTOLIC BLOOD PRESSURE: 120 MMHG | WEIGHT: 162.6 LBS | HEART RATE: 64 BPM

## 2024-05-29 DIAGNOSIS — F17.200 CURRENT SMOKER ON SOME DAYS: ICD-10-CM

## 2024-05-29 DIAGNOSIS — Z82.49 FAMILY HISTORY OF ISCHEMIC HEART DISEASE: ICD-10-CM

## 2024-05-29 DIAGNOSIS — R06.09 DYSPNEA ON EXERTION: ICD-10-CM

## 2024-05-29 DIAGNOSIS — R94.39 ABNORMAL STRESS TEST: ICD-10-CM

## 2024-05-29 DIAGNOSIS — I20.9 ANGINA, CLASS IV (CMS-HCC): Primary | ICD-10-CM

## 2024-05-29 PROCEDURE — 3008F BODY MASS INDEX DOCD: CPT | Performed by: INTERNAL MEDICINE

## 2024-05-29 PROCEDURE — 99214 OFFICE O/P EST MOD 30 MIN: CPT | Performed by: INTERNAL MEDICINE

## 2024-05-29 RX ORDER — NAPROXEN SODIUM 220 MG/1
81 TABLET, FILM COATED ORAL DAILY
Qty: 30 TABLET | Refills: 11 | OUTPATIENT
Start: 2024-05-29 | End: 2025-05-29

## 2024-05-29 NOTE — PATIENT INSTRUCTIONS
Continue same medications and treatments.   Patient educated on proper medication use.   Patient educated on risk factor modification.   Please bring any lab results from other providers / physicians to your next appointment.     Please bring all medicines, vitamins, and herbal supplements with you when you come to the office.     Prescriptions will not be filled unless you are compliant with your follow up appointments or have a follow up appointment scheduled as per instruction of your physician. Refills should be requested at the time of your visit.    LEFT HEART CATH POSSIBLE ORDERED, GIVE RANEXA 500 MG AND AMLODIPINE 2.5 MG ON ADMIT  START ASPIRIN 81 MG DAILY    IJenifer LPN, am scribing for and in the presence of Dr. John Cohen MD, FACC

## 2024-05-29 NOTE — PROGRESS NOTES
CARDIOLOGY OFFICE VISIT      CHIEF COMPLAINT      HISTORY OF PRESENT ILLNESS  The patient is being seen after initial evaluation on 5/1/2024.  The patient continues to be bothered by chest discomfort and dyspnea.  The chest discomfort is a retrosternal tightness like sensation.  There is no radiation of discomfort.  This can occur with exertion and at rest.  The patient is very concerned about this because of the strong family history of coronary disease myocardial infarction's and cardiac surgery.  I did go over her Holter monitor and echocardiogram with her.  These studies were unremarkable.  Her stress test was abnormal with significant ST depression consistent with myocardial ischemia.  I explained the results with her.  In view of her symptoms, risk factors, and positive family history of coronary artery disease I did recommend cardiac catheterization with possible PCI.  The procedure and risk were explained to the patient.  She agrees and wishes to proceed.      Impression:  Chest discomfort and dyspnea, new onset angina pectoris, CCS class IV with significant ST depression on stress test consistent with myocardial ischemia, recommend cardiac catheterization with PCI, patient agrees and wishes to proceed  Palpitations  1 recent syncopal episode, sounds like most likely secondary to postural hypotension  History of gastroesophageal reflux  History of fibromyalgia  Tobacco abuse  Positive family history of myocardial infarction  Positive family history of cardiac surgery, exact type not known     Please excuse any errors in grammar or translation related to this dictation.  Voice recognition software was utilized to prepare this document.    Past Medical History  Past Medical History:   Diagnosis Date    Pain in left foot 04/24/2022    Left foot pain       Social History  Social History     Tobacco Use    Smoking status: Some Days     Current packs/day: 0.50     Types: Cigarettes     Passive exposure: Current     Smokeless tobacco: Never   Vaping Use    Vaping status: Never Used   Substance Use Topics    Alcohol use: Not Currently    Drug use: Never       Family History     Family History   Problem Relation Name Age of Onset    Diabetes Father          Allergies:  No Known Allergies     Outpatient Medications:  Current Outpatient Medications   Medication Instructions    fluticasone (Flonase) 50 mcg/actuation nasal spray 2 sprays, Each Nostril, Daily, Shake gently. Before first use, prime pump. After use, clean tip and replace cap.    ibuprofen 800 mg, oral, 2 times daily    loratadine (CLARITIN) 10 mg, oral, Daily    omeprazole (PRILOSEC) 40 mg, oral, Daily before breakfast, Do not crush or chew.    pregabalin (Lyrica) 50 mg capsule TAKE 1 CAPSULE BY MOUTH THREE TIMES DAILY FOR 90 DAYS    topiramate (Topamax) 50 mg tablet 75 mg BID    Vitamin B-12 2,500 mcg tablet, sublingual SL tablet DISSOLVE 1 TABLET ONCE DAILY          REVIEW OF SYSTEMS  Review of Systems   All other systems reviewed and are negative.        VITALS  Vitals:    05/29/24 0752   BP: 120/80   Pulse: 64       PHYSICAL EXAM  Constitutional:       Appearance: Healthy appearance. Not in distress.   Neck:      Vascular: No JVR. JVD normal.   Pulmonary:      Effort: Pulmonary effort is normal.      Breath sounds: Normal breath sounds. No wheezing. No rhonchi. No rales.   Chest:      Chest wall: Not tender to palpatation.   Cardiovascular:      PMI at left midclavicular line. Normal rate. Regular rhythm. Normal S1. Normal S2.       Murmurs: There is no murmur.      No gallop.  No click. No rub.   Pulses:     Intact distal pulses.   Edema:     Peripheral edema absent.   Abdominal:      General: Bowel sounds are normal.      Palpations: Abdomen is soft.      Tenderness: There is no abdominal tenderness.   Musculoskeletal: Normal range of motion.         General: No tenderness. Skin:     General: Skin is warm and dry.   Neurological:      General: No focal deficit  present.      Mental Status: Alert and oriented to person, place and time.           ASSESSMENT AND PLAN      [unfilled]

## 2024-05-29 NOTE — H&P (VIEW-ONLY)
CARDIOLOGY OFFICE VISIT      CHIEF COMPLAINT      HISTORY OF PRESENT ILLNESS  The patient is being seen after initial evaluation on 5/1/2024.  The patient continues to be bothered by chest discomfort and dyspnea.  The chest discomfort is a retrosternal tightness like sensation.  There is no radiation of discomfort.  This can occur with exertion and at rest.  The patient is very concerned about this because of the strong family history of coronary disease myocardial infarction's and cardiac surgery.  I did go over her Holter monitor and echocardiogram with her.  These studies were unremarkable.  Her stress test was abnormal with significant ST depression consistent with myocardial ischemia.  I explained the results with her.  In view of her symptoms, risk factors, and positive family history of coronary artery disease I did recommend cardiac catheterization with possible PCI.  The procedure and risk were explained to the patient.  She agrees and wishes to proceed.      Impression:  Chest discomfort and dyspnea, new onset angina pectoris, CCS class IV with significant ST depression on stress test consistent with myocardial ischemia, recommend cardiac catheterization with PCI, patient agrees and wishes to proceed  Palpitations  1 recent syncopal episode, sounds like most likely secondary to postural hypotension  History of gastroesophageal reflux  History of fibromyalgia  Tobacco abuse  Positive family history of myocardial infarction  Positive family history of cardiac surgery, exact type not known     Please excuse any errors in grammar or translation related to this dictation.  Voice recognition software was utilized to prepare this document.    Past Medical History  Past Medical History:   Diagnosis Date    Pain in left foot 04/24/2022    Left foot pain       Social History  Social History     Tobacco Use    Smoking status: Some Days     Current packs/day: 0.50     Types: Cigarettes     Passive exposure: Current     Smokeless tobacco: Never   Vaping Use    Vaping status: Never Used   Substance Use Topics    Alcohol use: Not Currently    Drug use: Never       Family History     Family History   Problem Relation Name Age of Onset    Diabetes Father          Allergies:  No Known Allergies     Outpatient Medications:  Current Outpatient Medications   Medication Instructions    fluticasone (Flonase) 50 mcg/actuation nasal spray 2 sprays, Each Nostril, Daily, Shake gently. Before first use, prime pump. After use, clean tip and replace cap.    ibuprofen 800 mg, oral, 2 times daily    loratadine (CLARITIN) 10 mg, oral, Daily    omeprazole (PRILOSEC) 40 mg, oral, Daily before breakfast, Do not crush or chew.    pregabalin (Lyrica) 50 mg capsule TAKE 1 CAPSULE BY MOUTH THREE TIMES DAILY FOR 90 DAYS    topiramate (Topamax) 50 mg tablet 75 mg BID    Vitamin B-12 2,500 mcg tablet, sublingual SL tablet DISSOLVE 1 TABLET ONCE DAILY          REVIEW OF SYSTEMS  Review of Systems   All other systems reviewed and are negative.        VITALS  Vitals:    05/29/24 0752   BP: 120/80   Pulse: 64       PHYSICAL EXAM  Constitutional:       Appearance: Healthy appearance. Not in distress.   Neck:      Vascular: No JVR. JVD normal.   Pulmonary:      Effort: Pulmonary effort is normal.      Breath sounds: Normal breath sounds. No wheezing. No rhonchi. No rales.   Chest:      Chest wall: Not tender to palpatation.   Cardiovascular:      PMI at left midclavicular line. Normal rate. Regular rhythm. Normal S1. Normal S2.       Murmurs: There is no murmur.      No gallop.  No click. No rub.   Pulses:     Intact distal pulses.   Edema:     Peripheral edema absent.   Abdominal:      General: Bowel sounds are normal.      Palpations: Abdomen is soft.      Tenderness: There is no abdominal tenderness.   Musculoskeletal: Normal range of motion.         General: No tenderness. Skin:     General: Skin is warm and dry.   Neurological:      General: No focal deficit  present.      Mental Status: Alert and oriented to person, place and time.           ASSESSMENT AND PLAN      [unfilled]   complains of pain/discomfort

## 2024-06-04 ENCOUNTER — LAB (OUTPATIENT)
Dept: LAB | Facility: LAB | Age: 44
End: 2024-06-04
Payer: COMMERCIAL

## 2024-06-04 DIAGNOSIS — I20.9 ANGINA, CLASS IV (CMS-HCC): ICD-10-CM

## 2024-06-04 DIAGNOSIS — Z82.49 FAMILY HISTORY OF ISCHEMIC HEART DISEASE: ICD-10-CM

## 2024-06-04 DIAGNOSIS — R94.39 ABNORMAL STRESS TEST: ICD-10-CM

## 2024-06-04 LAB
ANION GAP SERPL CALC-SCNC: 12 MMOL/L (ref 10–20)
BUN SERPL-MCNC: 7 MG/DL (ref 6–23)
CALCIUM SERPL-MCNC: 8.9 MG/DL (ref 8.6–10.3)
CHLORIDE SERPL-SCNC: 106 MMOL/L (ref 98–107)
CO2 SERPL-SCNC: 24 MMOL/L (ref 21–32)
CREAT SERPL-MCNC: 1 MG/DL (ref 0.5–1.05)
EGFRCR SERPLBLD CKD-EPI 2021: 71 ML/MIN/1.73M*2
ERYTHROCYTE [DISTWIDTH] IN BLOOD BY AUTOMATED COUNT: 14.4 % (ref 11.5–14.5)
GLUCOSE SERPL-MCNC: 85 MG/DL (ref 74–99)
HCT VFR BLD AUTO: 35.7 % (ref 36–46)
HGB BLD-MCNC: 11.2 G/DL (ref 12–16)
MCH RBC QN AUTO: 27.5 PG (ref 26–34)
MCHC RBC AUTO-ENTMCNC: 31.4 G/DL (ref 32–36)
MCV RBC AUTO: 88 FL (ref 80–100)
NRBC BLD-RTO: 0 /100 WBCS (ref 0–0)
PLATELET # BLD AUTO: 445 X10*3/UL (ref 150–450)
POTASSIUM SERPL-SCNC: 4.3 MMOL/L (ref 3.5–5.3)
RBC # BLD AUTO: 4.08 X10*6/UL (ref 4–5.2)
SODIUM SERPL-SCNC: 138 MMOL/L (ref 136–145)
WBC # BLD AUTO: 8.9 X10*3/UL (ref 4.4–11.3)

## 2024-06-04 PROCEDURE — 80048 BASIC METABOLIC PNL TOTAL CA: CPT

## 2024-06-04 PROCEDURE — 36415 COLL VENOUS BLD VENIPUNCTURE: CPT

## 2024-06-04 PROCEDURE — 85027 COMPLETE CBC AUTOMATED: CPT

## 2024-06-05 ENCOUNTER — TELEPHONE (OUTPATIENT)
Dept: PRIMARY CARE | Facility: CLINIC | Age: 44
End: 2024-06-05
Payer: COMMERCIAL

## 2024-06-05 NOTE — LETTER
June 5, 2024     Patient: Jason Plata   YOB: 1980   Date of Visit: 6/5/2024       To Whom It May Concern:    Jason Plata was seen in my clinic on 04/18/2024. At that time I had previously excused her for this day; however Ms. Plata was seen this day due to her hospital visit 04/09/2024. Please excuse Jason for her absence from for the following absences 04/09/2024, 04/17/2024, & 04/18/2024.     Jason Plata 1980 is a compliant patient, and has been under my care. She is currently taking measures to get her health under control.       If you have any questions or concerns, please don't hesitate to call.         Sincerely,             Stuart Lopez, DO

## 2024-06-10 RX ORDER — METOPROLOL TARTRATE 25 MG/1
12.5 TABLET, FILM COATED ORAL ONCE
Status: CANCELLED | OUTPATIENT
Start: 2024-06-10

## 2024-06-11 ENCOUNTER — HOSPITAL ENCOUNTER (OUTPATIENT)
Facility: HOSPITAL | Age: 44
Setting detail: OUTPATIENT SURGERY
Discharge: HOME | End: 2024-06-11
Attending: INTERNAL MEDICINE | Admitting: INTERNAL MEDICINE
Payer: COMMERCIAL

## 2024-06-11 ENCOUNTER — HOSPITAL ENCOUNTER (OUTPATIENT)
Dept: CARDIOLOGY | Facility: HOSPITAL | Age: 44
Setting detail: OUTPATIENT SURGERY
Discharge: HOME | End: 2024-06-11
Payer: COMMERCIAL

## 2024-06-11 DIAGNOSIS — R94.39 ABNORMAL STRESS TEST: ICD-10-CM

## 2024-06-11 DIAGNOSIS — Z82.49 FAMILY HISTORY OF ISCHEMIC HEART DISEASE: ICD-10-CM

## 2024-06-11 DIAGNOSIS — I20.9 ANGINA, CLASS IV (CMS-HCC): Primary | ICD-10-CM

## 2024-06-11 LAB
ANION GAP SERPL CALC-SCNC: 10 MMOL/L (ref 10–20)
ATRIAL RATE: 78 BPM
B-HCG SERPL-ACNC: 3 MIU/ML
BUN SERPL-MCNC: 12 MG/DL (ref 6–23)
CALCIUM SERPL-MCNC: 8.9 MG/DL (ref 8.6–10.3)
CHLORIDE SERPL-SCNC: 106 MMOL/L (ref 98–107)
CO2 SERPL-SCNC: 25 MMOL/L (ref 21–32)
CREAT SERPL-MCNC: 0.98 MG/DL (ref 0.5–1.05)
EGFRCR SERPLBLD CKD-EPI 2021: 73 ML/MIN/1.73M*2
GLUCOSE SERPL-MCNC: 99 MG/DL (ref 74–99)
P AXIS: 47 DEGREES
P OFFSET: 187 MS
P ONSET: 140 MS
POTASSIUM SERPL-SCNC: 4.5 MMOL/L (ref 3.5–5.3)
PR INTERVAL: 172 MS
Q ONSET: 226 MS
QRS COUNT: 13 BEATS
QRS DURATION: 68 MS
QT INTERVAL: 372 MS
QTC CALCULATION(BAZETT): 424 MS
QTC FREDERICIA: 406 MS
R AXIS: 60 DEGREES
SODIUM SERPL-SCNC: 136 MMOL/L (ref 136–145)
T AXIS: 6 DEGREES
T OFFSET: 412 MS
VENTRICULAR RATE: 78 BPM

## 2024-06-11 PROCEDURE — 7100000010 HC PHASE TWO TIME - EACH INCREMENTAL 1 MINUTE: Performed by: INTERNAL MEDICINE

## 2024-06-11 PROCEDURE — 99152 MOD SED SAME PHYS/QHP 5/>YRS: CPT | Performed by: INTERNAL MEDICINE

## 2024-06-11 PROCEDURE — G0269 OCCLUSIVE DEVICE IN VEIN ART: HCPCS | Mod: TC,59 | Performed by: INTERNAL MEDICINE

## 2024-06-11 PROCEDURE — 80048 BASIC METABOLIC PNL TOTAL CA: CPT | Performed by: NURSE PRACTITIONER

## 2024-06-11 PROCEDURE — 7100000009 HC PHASE TWO TIME - INITIAL BASE CHARGE: Performed by: INTERNAL MEDICINE

## 2024-06-11 PROCEDURE — 2780000003 HC OR 278 NO HCPCS: Performed by: INTERNAL MEDICINE

## 2024-06-11 PROCEDURE — 2500000001 HC RX 250 WO HCPCS SELF ADMINISTERED DRUGS (ALT 637 FOR MEDICARE OP): Performed by: NURSE PRACTITIONER

## 2024-06-11 PROCEDURE — 93458 L HRT ARTERY/VENTRICLE ANGIO: CPT | Performed by: INTERNAL MEDICINE

## 2024-06-11 PROCEDURE — 84702 CHORIONIC GONADOTROPIN TEST: CPT | Performed by: INTERNAL MEDICINE

## 2024-06-11 PROCEDURE — 93010 ELECTROCARDIOGRAM REPORT: CPT | Performed by: INTERNAL MEDICINE

## 2024-06-11 PROCEDURE — 93005 ELECTROCARDIOGRAM TRACING: CPT | Mod: 59

## 2024-06-11 PROCEDURE — 2550000001 HC RX 255 CONTRASTS: Performed by: INTERNAL MEDICINE

## 2024-06-11 PROCEDURE — 2500000004 HC RX 250 GENERAL PHARMACY W/ HCPCS (ALT 636 FOR OP/ED): Performed by: INTERNAL MEDICINE

## 2024-06-11 PROCEDURE — 2500000005 HC RX 250 GENERAL PHARMACY W/O HCPCS: Performed by: INTERNAL MEDICINE

## 2024-06-11 PROCEDURE — C1760 CLOSURE DEV, VASC: HCPCS | Performed by: INTERNAL MEDICINE

## 2024-06-11 PROCEDURE — 36415 COLL VENOUS BLD VENIPUNCTURE: CPT | Performed by: INTERNAL MEDICINE

## 2024-06-11 PROCEDURE — 2720000007 HC OR 272 NO HCPCS: Performed by: INTERNAL MEDICINE

## 2024-06-11 RX ORDER — AMLODIPINE BESYLATE 5 MG/1
2.5 TABLET ORAL ONCE
Status: COMPLETED | OUTPATIENT
Start: 2024-06-11 | End: 2024-06-11

## 2024-06-11 RX ORDER — LIDOCAINE HYDROCHLORIDE 20 MG/ML
INJECTION, SOLUTION INFILTRATION; PERINEURAL AS NEEDED
Status: DISCONTINUED | OUTPATIENT
Start: 2024-06-11 | End: 2024-06-11 | Stop reason: HOSPADM

## 2024-06-11 RX ORDER — ASPIRIN 325 MG
325 TABLET ORAL ONCE
Status: DISCONTINUED | OUTPATIENT
Start: 2024-06-11 | End: 2024-06-11

## 2024-06-11 RX ORDER — FENTANYL CITRATE 50 UG/ML
INJECTION, SOLUTION INTRAMUSCULAR; INTRAVENOUS AS NEEDED
Status: DISCONTINUED | OUTPATIENT
Start: 2024-06-11 | End: 2024-06-11 | Stop reason: HOSPADM

## 2024-06-11 RX ORDER — RANOLAZINE 500 MG/1
500 TABLET, EXTENDED RELEASE ORAL ONCE
Status: COMPLETED | OUTPATIENT
Start: 2024-06-11 | End: 2024-06-11

## 2024-06-11 RX ORDER — MIDAZOLAM HYDROCHLORIDE 1 MG/ML
INJECTION, SOLUTION INTRAMUSCULAR; INTRAVENOUS AS NEEDED
Status: DISCONTINUED | OUTPATIENT
Start: 2024-06-11 | End: 2024-06-11 | Stop reason: HOSPADM

## 2024-06-11 RX ORDER — SODIUM CHLORIDE 9 MG/ML
100 INJECTION, SOLUTION INTRAVENOUS CONTINUOUS
Status: DISCONTINUED | OUTPATIENT
Start: 2024-06-11 | End: 2024-06-11

## 2024-06-11 RX ORDER — SODIUM CHLORIDE 9 MG/ML
100 INJECTION, SOLUTION INTRAVENOUS CONTINUOUS
Status: ACTIVE | OUTPATIENT
Start: 2024-06-11 | End: 2024-06-11

## 2024-06-11 ASSESSMENT — COLUMBIA-SUICIDE SEVERITY RATING SCALE - C-SSRS
6. HAVE YOU EVER DONE ANYTHING, STARTED TO DO ANYTHING, OR PREPARED TO DO ANYTHING TO END YOUR LIFE?: NO
1. IN THE PAST MONTH, HAVE YOU WISHED YOU WERE DEAD OR WISHED YOU COULD GO TO SLEEP AND NOT WAKE UP?: NO
2. HAVE YOU ACTUALLY HAD ANY THOUGHTS OF KILLING YOURSELF?: NO

## 2024-06-11 ASSESSMENT — ENCOUNTER SYMPTOMS
LOSS OF SENSATION IN FEET: 0
DEPRESSION: 0
OCCASIONAL FEELINGS OF UNSTEADINESS: 0

## 2024-06-11 NOTE — POST-PROCEDURE NOTE
Physician Transition of Care Summary  Invasive Cardiovascular Lab    Procedure Date: 6/11/2024  Attending:    * John Cohen - Primary  Resident/Fellow/Other Assistant: Surgeons and Role:  * No surgeons found with a matching role *    Pre Procedure Diagnosis:   Chest pain, new onset angina pectoris, strong family history of CAD, abnormal graded exercise test    Post Procedure Diagnosis:   Angiographically normal coronary arteries, normal left ventricular systolic function,    Complications:   None    Stents/Implants:   None    Anticoagulation/Antiplatelet Plan:   None    Estimated Blood Loss:   0 mL    Electronically signed by: John Cohen MD, 6/11/2024 8:33 AM    Anesthesia: Moderate                            anesthesia Staff: None

## 2024-06-11 NOTE — PROGRESS NOTES
Patient is stable status post LHC under the care of Dr. Cohen.  Discussed results of procedure with patient.  Pictures provided.  Findings of the LHC revealed angiographically normal coronary arteries and a normal LVEF.  Medical management is advised.  Smoking cessation is strongly advised.  Patient encouraged to follow-up with her PCP if she continues to have ongoing symptoms to evaluate for other etiology.  Patient will be scheduled for an annual follow-up with Dr. Cohen or sooner as needed.  All questions answered.  Patient verbalized understanding.

## 2024-06-12 ENCOUNTER — TELEPHONE (OUTPATIENT)
Dept: PRIMARY CARE | Facility: CLINIC | Age: 44
End: 2024-06-12
Payer: COMMERCIAL

## 2024-06-12 ENCOUNTER — TELEPHONE (OUTPATIENT)
Dept: CARDIOLOGY | Facility: CLINIC | Age: 44
End: 2024-06-12
Payer: COMMERCIAL

## 2024-06-12 VITALS
WEIGHT: 164.24 LBS | DIASTOLIC BLOOD PRESSURE: 72 MMHG | TEMPERATURE: 97.3 F | HEIGHT: 66 IN | RESPIRATION RATE: 16 BRPM | HEART RATE: 88 BPM | OXYGEN SATURATION: 100 % | SYSTOLIC BLOOD PRESSURE: 114 MMHG | BODY MASS INDEX: 26.4 KG/M2

## 2024-06-12 NOTE — LETTER
24    Patient: Jason Plata        : [birth year]          MRN: [id number]                     Per Dr. John Cohen MD patient can return to work on 24 at previous normal capacity with no restrictions.               Dr. John Cohen MD

## 2024-06-12 NOTE — TELEPHONE ENCOUNTER
Patient called and wants to know when she can return to work after heart cath yesterday and needs to have a return to work. Routed to Amanda Hanks LPN

## 2024-06-12 NOTE — TELEPHONE ENCOUNTER
Pharmacy    [facility] [address] [place] [address] - [id number] [place] [address]  [id number] [place] [id number]  Phone: [phone]  Fax: [phone]     pregabalin (Lyrica) 50 mg capsule       Patient is on Lyrica with pain management [facility]. She states it is causing a lot of water retention. Her pain management doctor referred her to you for a water pill.     Pain management will not prescribe.     Please advise, Thanks.     She is scheduled for next week to see you!

## 2024-06-13 NOTE — TELEPHONE ENCOUNTER
Per Dr. John Cohen , if patient has a non-physical job, she can return to work on Friday 6/14.  If it is a physical job, she can return on Monday 6/17.  Call returned to patient and left voice mail message requesting return call to obtain letter.

## 2024-06-14 NOTE — TELEPHONE ENCOUNTER
Per Dr. John Cohen MD patient can return to work 6/14/24. Patient stated she is a  at [employer]. Printed for provider signing. Patient request it be mailed to her house.

## 2024-06-18 ENCOUNTER — APPOINTMENT (OUTPATIENT)
Dept: PRIMARY CARE | Facility: CLINIC | Age: 44
End: 2024-06-18
Payer: COMMERCIAL

## 2024-07-04 DIAGNOSIS — J01.90 ACUTE RHINOSINUSITIS: ICD-10-CM

## 2024-07-08 RX ORDER — LORATADINE 10 MG/1
10 TABLET ORAL DAILY
Qty: 30 TABLET | Refills: 0 | Status: SHIPPED | OUTPATIENT
Start: 2024-07-08

## 2024-08-13 ENCOUNTER — TELEPHONE (OUTPATIENT)
Dept: PRIMARY CARE | Facility: CLINIC | Age: 44
End: 2024-08-13

## 2024-08-13 ENCOUNTER — OFFICE VISIT (OUTPATIENT)
Dept: PRIMARY CARE | Facility: CLINIC | Age: 44
End: 2024-08-13
Payer: COMMERCIAL

## 2024-08-13 VITALS
HEART RATE: 99 BPM | WEIGHT: 158 LBS | OXYGEN SATURATION: 98 % | RESPIRATION RATE: 18 BRPM | HEIGHT: 66 IN | TEMPERATURE: 98.2 F | SYSTOLIC BLOOD PRESSURE: 116 MMHG | DIASTOLIC BLOOD PRESSURE: 72 MMHG | BODY MASS INDEX: 25.39 KG/M2

## 2024-08-13 DIAGNOSIS — U07.1 COVID-19: Primary | ICD-10-CM

## 2024-08-13 PROCEDURE — 3008F BODY MASS INDEX DOCD: CPT | Performed by: FAMILY MEDICINE

## 2024-08-13 PROCEDURE — 99213 OFFICE O/P EST LOW 20 MIN: CPT | Performed by: FAMILY MEDICINE

## 2024-08-13 RX ORDER — AMOXICILLIN AND CLAVULANATE POTASSIUM 875; 125 MG/1; MG/1
1 TABLET, FILM COATED ORAL 2 TIMES DAILY
Qty: 14 TABLET | Refills: 0 | Status: SHIPPED | OUTPATIENT
Start: 2024-08-13 | End: 2024-08-20

## 2024-08-13 RX ORDER — AMOXICILLIN AND CLAVULANATE POTASSIUM 875; 125 MG/1; MG/1
1 TABLET, FILM COATED ORAL
COMMUNITY
Start: 2024-08-07

## 2024-08-13 ASSESSMENT — ENCOUNTER SYMPTOMS
FEVER: 0
CHILLS: 0
VOMITING: 1
SORE THROAT: 1
FATIGUE: 0
CHEST TIGHTNESS: 0
APNEA: 0
DIARRHEA: 1
SINUS PRESSURE: 1
HEADACHES: 0
COUGH: 1
NAUSEA: 1
SHORTNESS OF BREATH: 1
EYE REDNESS: 0

## 2024-08-13 NOTE — PATIENT INSTRUCTIONS
Wear a mask when you are out in public and or in crowded places  Avoid going to places such as nursing homes, hospitals if at all possible  Stay safe  WHAT TO DO IF YOU ARE SICK WITH CORONAVIRUS DISEASE 2019 (COVID-19)    If you are sick with COVID-19 or suspect you are infected with the virus that causes COVID-19, the Centers for Disease Control and Prevention reccomends you follow the steps below to help prevent the disease from spreading to people in your home and community.    Stay Home Except to Get Medical Care  You should restrict activities outside your home, except for getting urgent medical care.  Please call ahead before going to any medical facility. Do not go to work, school or public areas. Avoid using public transportation, ride-sharing or taxis.    Separate Yourself from Other People and Animals in Your Home    People: As much as possible, you should stay in a specific room and away from other people in your home. Also, you should use a separate bathroom, if available.    Animals: Although there have not been reports of pets becoming sick with COVID-19, it is still recommended that you avoid contact with your pets while you are sick. If another member of your household is not able to care for them, wash your hands before and after you interact with pets and wear a face mask.    Call Ahead Before Visiting Your Doctor  If you have a medical appointment, call the healthcare provider and tell them that you have or may have COVID-19. This will help the healthcare provider’s office take steps to keep other people from getting infected or exposed.    Wear a Face Mask  You should wear a face mask when you are around other people (e.g., sharing a room or vehicle) or pets and before you enter a healthcare provider’s office. If you are not able to wear a face mask (for example, because it causes trouble breathing), then people who live with you should not stay in the same room with you, or they should wear a  face mask if they enter your room.    Cover Your Coughs and Sneezes  Cover your mouth and nose with a tissue when you cough or sneeze. Throw used tissues in a lined trash can, then wash your hands with soap and water for at least 20 seconds or use an alcohol-based hand  that contains at least 60% alcohol.    Clean Your Hands Often  Wash your hands often with soap and water for at least 20 seconds. If soap and water are not available, clean your hands with an alcohol-based hand  that contains at least 60% alcohol, covering all surfaces of your hands and rubbing them together until they feel dry. Soap and water should be used preferentially if hands are visibly dirty. Avoid touching your eyes, nose and mouth with unwashed hands.    Avoid Sharing Personal Household Items  You should not share dishes, drinking glasses, cups, eating utensils, towels or bedding with other people or pets in your home. After using these items, they should be washed thoroughly with soap and water.    Clean All High-Touch Surfaces Every Day  High-touch surfaces include counters, tabletops, doorknobs, bathroom fixtures, toilets, phones, keyboards, tablets and bedside tables. Also, clean any surfaces that may have blood, stool or body fluids on them. Use a household cleaning spray or wipe, according to the label instructions. Labels contain instructions for safe and effective use of the cleaning product including precautions you should take when applying the product, such as wearing gloves and making sure you have good ventilation during use of the product.    Monitor Your Symptoms  Seek prompt medical attention if your illness is worsening (e.g., difficulty breathing). Before seeking care, call your healthcare provider and tell them that you have, or are being evaluated for, COVID-19. Put on a face mask before you enter the facility. These steps will help the healthcare provider’s office to keep other people in the office or  waiting room from getting infected or exposed. Ask your healthcare provider to call the local or state health department. Persons who are placed under active monitoring or facilitated self-monitoring should follow instructions provided by their local health department or occupational health professionals, as appropriate. If you have a medical emergency and need to call 911, notify the dispatch personnel that you have, or are  being evaluated for COVID-19. If possible, put on a face mask before emergency medical services arrive.    Discontinuing Home Isolation  Patients with confirmed COVID-19 should remain under home isolation precautions until the risk of secondary transmission to others is thought to be low. The decision to discontinue home isolation precautions should be made on a case-by-case basis, in consultation with healthcare providers and [facility] and [facility] health departments.    RECOMMENDED PRECAUTIONS FOR HOUSEHOLD MEMBERS, INTIMATE PARTNERS AND CAREGIVERS  Make sure that you understand and can help the patient follow their healthcare provider’s instructions for medication(s) and care. You should help the patient with basic needs in the home and provide support for getting groceries, prescriptions, and other personal needs.  Monitor the patient’s symptoms. If the patient is getting sicker, call his or her healthcare provider and tell them that the patient has laboratory-confirmed COVID-19. This will help the healthcare provider’s office take steps to keep other people in the office or waiting room from getting infected. Ask the healthcare provider to call the local or [facility] health department for additional guidance. If the patient has a medical emergency and you need to call 911, notify the dispatch personnel that the patient has, or is being evaluated for COVID-19.  Household members should stay in another room or be  from the patient as much as possible. Household members should use a separate  bedroom and bathroom, if available.  Prohibit visitors who do not have an essential need to be in the home.  Household members should care for any pets in the home. Do not handle pets or other animals while sick.  Make sure that shared spaces in the home have good air flow, such as by an air conditioner or an opened window, weather permitting.  Perform hand hygiene frequently. Wash your hands often with soap and water for at least 20 seconds or use an alcohol-based hand  that contains 60% to 95% alcohol, covering all surfaces of your hands and rubbing them together until they feel dry. Soap and water should be used preferentially if hands are visibly dirty.  Avoid touching your eyes, nose and mouth with unwashed hands.  The patient should wear a face mask when you are around other people. If the patient is not able to wear a face mask (for example, because it causes trouble breathing), you, as the caregiver, should wear a mask when you are in the same room as the patient.  Wear a disposable face mask and gloves when you touch or have contact with the patient’s blood, stool or body fluids, such as saliva, sputum, nasal mucus, vomit or urine.  Throw out disposable face masks and gloves after using them. Do not reuse.  When removing personal protective equipment, first remove and dispose of gloves.  Then, immediately clean your hands with soap and water or alcohol-based hand .  Next, remove and dispose of face mask, and immediately clean your hands again with soap and water or alcohol-based hand .  Avoid sharing household items with the patient. You should not share dishes, drinking glasses, cups, eating utensils, towels, bedding or other items. After the patient uses these items, you should wash them thoroughly (see below “Wash laundry thoroughly”).  Clean all high-touch surfaces, such as counters, tabletops, doorknobs, bathroom fixtures, toilets, phones, keyboards, tablets and bedside  tables, every day. Also, clean any surfaces that may have blood, stool or body fluids on them.  Use a household cleaning spray or wipe, according to the label instructions. Labels contain instructions for safe and effective use of the cleaning product including precautions you should take when applying the product, such as wearing gloves and making sure you have good ventilation during use of the product.  Wash laundry thoroughly.  Immediately remove and wash clothes or bedding that have blood, stool or body fluids on them.  Wear disposable gloves while handling soiled items and keep soiled items away from your body. Clean your hands (with soap and water or an alcohol-based hand ) immediately after removing your gloves.  Read and follow directions on labels of laundry or clothing items and detergent. In general, using a normal laundry detergent according to washing machine instructions and dry thoroughly using the warmest temperatures recommended on the clothing label.  Place all used disposable gloves, face masks and other contaminated items in a lined container before disposing of them with other household waste. Clean your hands (with soap and water or an alcohol-based hand ) immediately after handling these items. Soap and water should be used preferentially if hands are visibly dirty.  Discuss any additional questions with your or local health department or healthcare provider. Check available hours when contacting your local health department.    Source: www.cdc.gov/coronavirus/2019-ncov  Updated 3.15.2020 4:40p  20-CCC-[id number]   Please consider the following medications to help    mitigate  Covid during this time  Vitamin  mg    daily  B complex daily  ZINC   30 - 50  MG  DAILY     VITAMIN D 3   500O IU DAILY        Multivitamin with zinc  Extra rest  Stress reduction  IN  SYMPTOMATIC  PATIENTS, MONITORING WITH  HOME PULSE OXIMETRY  IS RECOMMENDED..  AMBULATORY  DESATURATIONS BELOW   94%  SHOULD PROMPT HOSPITAL  ADMISSSION

## 2024-08-13 NOTE — LETTER
August 13, 2024     Patient: Jason Plata   YOB: 1980   Date of Visit: 8/13/2024           To Whom It May Concern:    Jason Plata was seen in my clinic on 8/13/2024. She tested positive for COVID-19 on 08/07/2024. Please excuse Jason for her absence from work during this time. She has been seen and evaluated by myself and please excuse her from 08/07/2024 until 08/19/2024 when I see her back and reevaluate her at that time.       If you have any questions or concerns, please don't hesitate to call.           Sincerely,     Stuart Lopez, DO

## 2024-08-13 NOTE — ASSESSMENT & PLAN NOTE
SEE WRAP UP   Rest and drink plenty of water/fluid  Please use a humidifier or   use warm  mist  in a   hot shower ...repeat   3-4 times a day for approximatelly .... 10 minutes at a time..... this lessens congestion....  Use salt water sprays..... as directed... saline sprays  Apply a warm moist wash...cloth to  your face  3-4 times a day  Avoid tobacco smoke and other environmental irritants

## 2024-08-13 NOTE — PROGRESS NOTES
Subjective   Patient ID: Bereather TRIXIE Plata is a 44 y.o. female who presents for covid.  HPI  Patien worsening cough or   +shortness of breath. Patient a  admits  to  fever greater than 100 or chills/shaking or DENIES  loss of taste or smell. The patient does   have to of the following symptoms; +sore throat,= diarrhea,+ body aches/malaise, +headaches, +nausea/+vomiting, orNASA L C ongestion.   Review of Systems   Constitutional:  Negative for chills, fatigue and fever.   HENT:  Positive for congestion, sinus pressure and sore throat.    Eyes:  Negative for redness.   Respiratory:  Positive for cough and shortness of breath. Negative for apnea and chest tightness.    Gastrointestinal:  Positive for diarrhea, nausea and vomiting.   Neurological:  Negative for headaches.       Objective   Physical Exam  Vitals reviewed.   Constitutional:       Appearance: Normal appearance.   HENT:      Head: Normocephalic.      Right Ear: External ear normal.      Left Ear: External ear normal.      Nose: Congestion and rhinorrhea present.      Mouth/Throat:      Mouth: Mucous membranes are moist.   Eyes:      Conjunctiva/sclera: Conjunctivae normal.   Cardiovascular:      Rate and Rhythm: Regular rhythm.      Heart sounds: Normal heart sounds.   Pulmonary:      Effort: Pulmonary effort is normal.      Breath sounds: Normal breath sounds.   Abdominal:      General: Bowel sounds are normal.      Palpations: Abdomen is soft.   Musculoskeletal:         General: Normal range of motion.      Cervical back: Neck supple.   Skin:     General: Skin is warm and dry.   Neurological:      General: No focal deficit present.      Mental Status: She is alert and oriented to person, place, and time.   Psychiatric:         Behavior: Behavior normal.         Judgment: Judgment normal.         Labs        Assessment/Plan   Problem List Items Addressed This Visit       COVID-19 - Primary     SEE WRAP UP   Rest and drink plenty of water/fluid  Please use  a humidifier or   use warm  mist  in a   hot shower ...repeat   3-4 times a day for approximatelly .... 10 minutes at a time..... this lessens congestion....  Use salt water sprays..... as directed... saline sprays  Apply a warm moist wash...cloth to  your face  3-4 times a day  Avoid tobacco smoke and other environmental irritants          Relevant Medications    amoxicillin-pot clavulanate (Augmentin) 875-125 mg tablet

## 2024-08-19 ENCOUNTER — OFFICE VISIT (OUTPATIENT)
Dept: PRIMARY CARE | Facility: CLINIC | Age: 44
End: 2024-08-19
Payer: COMMERCIAL

## 2024-08-19 ENCOUNTER — TELEPHONE (OUTPATIENT)
Dept: PRIMARY CARE | Facility: CLINIC | Age: 44
End: 2024-08-19

## 2024-08-19 ENCOUNTER — APPOINTMENT (OUTPATIENT)
Dept: NEUROLOGY | Facility: CLINIC | Age: 44
End: 2024-08-19
Payer: COMMERCIAL

## 2024-08-19 VITALS
RESPIRATION RATE: 18 BRPM | BODY MASS INDEX: 25.39 KG/M2 | DIASTOLIC BLOOD PRESSURE: 80 MMHG | HEART RATE: 82 BPM | HEIGHT: 66 IN | WEIGHT: 158 LBS | SYSTOLIC BLOOD PRESSURE: 130 MMHG | OXYGEN SATURATION: 97 % | TEMPERATURE: 97.6 F

## 2024-08-19 DIAGNOSIS — K21.00 GASTROESOPHAGEAL REFLUX DISEASE WITH ESOPHAGITIS, UNSPECIFIED WHETHER HEMORRHAGE: ICD-10-CM

## 2024-08-19 DIAGNOSIS — D50.8 OTHER IRON DEFICIENCY ANEMIA: Primary | ICD-10-CM

## 2024-08-19 DIAGNOSIS — U07.1 COVID-19: ICD-10-CM

## 2024-08-19 DIAGNOSIS — E53.8 LOW SERUM VITAMIN B12: ICD-10-CM

## 2024-08-19 DIAGNOSIS — Z12.11 SCREENING FOR COLON CANCER: ICD-10-CM

## 2024-08-19 PROCEDURE — 3008F BODY MASS INDEX DOCD: CPT | Performed by: FAMILY MEDICINE

## 2024-08-19 PROCEDURE — 99214 OFFICE O/P EST MOD 30 MIN: CPT | Performed by: FAMILY MEDICINE

## 2024-08-19 RX ORDER — OMEPRAZOLE 20 MG/1
20 CAPSULE, DELAYED RELEASE ORAL
Qty: 30 CAPSULE | Refills: 11 | Status: SHIPPED | OUTPATIENT
Start: 2024-08-19 | End: 2024-09-18

## 2024-08-19 RX ORDER — FERROUS SULFATE 325(65) MG
1 TABLET ORAL
Qty: 30 TABLET | Refills: 3 | Status: SHIPPED | OUTPATIENT
Start: 2024-08-19

## 2024-08-19 NOTE — ASSESSMENT & PLAN NOTE
The reflux esophagitis he has been occurring in a recurrent pattern for years. The course has been without change. The reflux is described as  moderate. The patient remains compliant on meds.. The patient takes medications in a daily..fashion. Denies dysphagia hoarseness or odynophagia... decrease      to 20mg    priolsec   and try totaper down

## 2024-08-19 NOTE — LETTER
August 19, 2024     Patient: Jason Plata   YOB: 1980   Date of Visit: 8/19/2024       To Whom It May Concern:    Jason Plata was seen in my clinic on 8/19/2024 at 11:00am. Please excuse Jason for her absence from school on this day to make the appointment. She may return to work on 08/20/2024.     If you have any questions or concerns, please don't hesitate to call.         Sincerely,    Stuart Lopez, DO

## 2024-08-19 NOTE — PROGRESS NOTES
Subjective   Patient ID: Jason Plata is a 44 y.o. female who presents for Follow-up.  HPI     f/u  covid  no  longer short of breath ... No worsenin  cough  all symptoms below resolved   The reflux esophagitis he has been occurring in a recurrent pattern for years. The course has been without change. The reflux is described as  moderate. The patient remains compliant on meds.. The patient takes medications in a daily..fashion. Denies dysphagia hoarseness or odynophagia...   Fibromyalgia and taking pregabalin  and  helping  Anemia...  heavy menses    .. Never had  colonosscopy    8/13/24...Patient worsening cough or   +shortness of breath. Patient a  admits  to  fever greater than 100 or chills/shaking or DENIES  loss of taste or smell. The patient does   have to of the following symptoms; +sore throat,= diarrhea,+ body aches/malaise, +headaches, +nausea/+vomiting, orNASA L C ongestion.   Review of Systems  All other  pertinent  systems reviewed and are negative except  those  mentioned  in HPI   Objective   Physical Exam  general: alert oriented x three  HEENT hearing normal to voice  Neck supple  Lungs respirations non-labored.  Cardiovascular: no peripheral edema  Skin: warm and dry without rash  Psych: judgement and insight normal  Musculoskeletal:  ambulation normal,    lymph:negative cervical  LYMPADENOPATHY  thyroid: non palpable enlargement   Labs        Assessment/Plan   Problem List Items Addressed This Visit       Low serum vitamin B12    Relevant Orders    Vitamin B12    Anemia - Primary     Get iron and  b12    nad ferritin... dcrease    prilosec   .. Will refer to gi for  oclonoscopy         Relevant Medications    ferrous sulfate, 325 mg ferrous sulfate, tablet    Other Relevant Orders    Iron level    Ferritin    CBC and Auto Differential    COVID-19     Resolved with treatment         Reflux esophagitis      The reflux esophagitis he has been occurring in a recurrent pattern for years. The  course has been without change. The reflux is described as  moderate. The patient remains compliant on meds.. The patient takes medications in a daily..fashion. Denies dysphagia hoarseness or odynophagia... decrease      to 20mg    priolsec   and try totaper down         Relevant Medications    omeprazole (PriLOSEC) 20 mg DR capsule     Other Visit Diagnoses       Screening for colon cancer        Relevant Orders    Colonoscopy Screening; High Risk Patient

## 2024-08-20 ENCOUNTER — LAB (OUTPATIENT)
Dept: LAB | Facility: LAB | Age: 44
End: 2024-08-20
Payer: COMMERCIAL

## 2024-08-20 DIAGNOSIS — E53.8 LOW SERUM VITAMIN B12: ICD-10-CM

## 2024-08-20 DIAGNOSIS — D50.8 OTHER IRON DEFICIENCY ANEMIA: ICD-10-CM

## 2024-08-20 LAB
BASOPHILS # BLD AUTO: 0.07 X10*3/UL (ref 0–0.1)
BASOPHILS NFR BLD AUTO: 0.8 %
EOSINOPHIL # BLD AUTO: 0.21 X10*3/UL (ref 0–0.7)
EOSINOPHIL NFR BLD AUTO: 2.3 %
ERYTHROCYTE [DISTWIDTH] IN BLOOD BY AUTOMATED COUNT: 14.8 % (ref 11.5–14.5)
FERRITIN SERPL-MCNC: 25 NG/ML (ref 8–150)
HCT VFR BLD AUTO: 34.6 % (ref 36–46)
HGB BLD-MCNC: 11 G/DL (ref 12–16)
IMM GRANULOCYTES # BLD AUTO: 0.03 X10*3/UL (ref 0–0.7)
IMM GRANULOCYTES NFR BLD AUTO: 0.3 % (ref 0–0.9)
IRON SERPL-MCNC: 34 UG/DL (ref 35–150)
LYMPHOCYTES # BLD AUTO: 2.4 X10*3/UL (ref 1.2–4.8)
LYMPHOCYTES NFR BLD AUTO: 26 %
MCH RBC QN AUTO: 26.9 PG (ref 26–34)
MCHC RBC AUTO-ENTMCNC: 31.8 G/DL (ref 32–36)
MCV RBC AUTO: 85 FL (ref 80–100)
MONOCYTES # BLD AUTO: 0.77 X10*3/UL (ref 0.1–1)
MONOCYTES NFR BLD AUTO: 8.4 %
NEUTROPHILS # BLD AUTO: 5.74 X10*3/UL (ref 1.2–7.7)
NEUTROPHILS NFR BLD AUTO: 62.2 %
NRBC BLD-RTO: 0 /100 WBCS (ref 0–0)
PLATELET # BLD AUTO: 433 X10*3/UL (ref 150–450)
RBC # BLD AUTO: 4.09 X10*6/UL (ref 4–5.2)
VIT B12 SERPL-MCNC: 1066 PG/ML (ref 211–911)
WBC # BLD AUTO: 9.2 X10*3/UL (ref 4.4–11.3)

## 2024-08-20 PROCEDURE — 83540 ASSAY OF IRON: CPT

## 2024-08-20 PROCEDURE — 36415 COLL VENOUS BLD VENIPUNCTURE: CPT

## 2024-08-20 PROCEDURE — 82728 ASSAY OF FERRITIN: CPT

## 2024-08-20 PROCEDURE — 85025 COMPLETE CBC W/AUTO DIFF WBC: CPT

## 2024-08-20 PROCEDURE — 82607 VITAMIN B-12: CPT

## 2024-08-22 ENCOUNTER — TELEPHONE (OUTPATIENT)
Dept: GASTROENTEROLOGY | Facility: EXTERNAL LOCATION | Age: 44
End: 2024-08-22
Payer: COMMERCIAL

## 2024-09-06 ENCOUNTER — APPOINTMENT (OUTPATIENT)
Dept: NEUROLOGY | Facility: CLINIC | Age: 44
End: 2024-09-06
Payer: COMMERCIAL

## 2024-09-16 ENCOUNTER — ANESTHESIA EVENT (OUTPATIENT)
Dept: GASTROENTEROLOGY | Facility: EXTERNAL LOCATION | Age: 44
End: 2024-09-16

## 2024-09-23 ENCOUNTER — ANESTHESIA (OUTPATIENT)
Dept: GASTROENTEROLOGY | Facility: EXTERNAL LOCATION | Age: 44
End: 2024-09-23

## 2024-09-23 ENCOUNTER — APPOINTMENT (OUTPATIENT)
Dept: GASTROENTEROLOGY | Facility: EXTERNAL LOCATION | Age: 44
End: 2024-09-23
Payer: COMMERCIAL

## 2024-09-23 VITALS
WEIGHT: 158 LBS | HEIGHT: 66 IN | OXYGEN SATURATION: 100 % | BODY MASS INDEX: 25.39 KG/M2 | DIASTOLIC BLOOD PRESSURE: 58 MMHG | SYSTOLIC BLOOD PRESSURE: 96 MMHG | TEMPERATURE: 97.5 F | HEART RATE: 86 BPM | RESPIRATION RATE: 20 BRPM

## 2024-09-23 DIAGNOSIS — Z12.11 SCREENING FOR COLON CANCER: Primary | ICD-10-CM

## 2024-09-23 PROCEDURE — 45380 COLONOSCOPY AND BIOPSY: CPT | Performed by: INTERNAL MEDICINE

## 2024-09-23 RX ORDER — PROPOFOL 10 MG/ML
INJECTION, EMULSION INTRAVENOUS AS NEEDED
Status: DISCONTINUED | OUTPATIENT
Start: 2024-09-23 | End: 2024-09-23

## 2024-09-23 RX ORDER — SODIUM CHLORIDE 9 MG/ML
20 INJECTION, SOLUTION INTRAVENOUS CONTINUOUS
Status: CANCELLED | OUTPATIENT
Start: 2024-09-23

## 2024-09-23 RX ORDER — LIDOCAINE HYDROCHLORIDE 20 MG/ML
INJECTION, SOLUTION INFILTRATION; PERINEURAL AS NEEDED
Status: DISCONTINUED | OUTPATIENT
Start: 2024-09-23 | End: 2024-09-23

## 2024-09-23 RX ORDER — SODIUM CHLORIDE 9 MG/ML
INJECTION, SOLUTION INTRAVENOUS CONTINUOUS PRN
Status: DISCONTINUED | OUTPATIENT
Start: 2024-09-23 | End: 2024-09-23

## 2024-09-23 SDOH — HEALTH STABILITY: MENTAL HEALTH: CURRENT SMOKER: 1

## 2024-09-23 ASSESSMENT — COLUMBIA-SUICIDE SEVERITY RATING SCALE - C-SSRS
2. HAVE YOU ACTUALLY HAD ANY THOUGHTS OF KILLING YOURSELF?: NO
2. HAVE YOU ACTUALLY HAD ANY THOUGHTS OF KILLING YOURSELF?: NO
6. HAVE YOU EVER DONE ANYTHING, STARTED TO DO ANYTHING, OR PREPARED TO DO ANYTHING TO END YOUR LIFE?: NO
1. IN THE PAST MONTH, HAVE YOU WISHED YOU WERE DEAD OR WISHED YOU COULD GO TO SLEEP AND NOT WAKE UP?: NO
1. IN THE PAST MONTH, HAVE YOU WISHED YOU WERE DEAD OR WISHED YOU COULD GO TO SLEEP AND NOT WAKE UP?: NO
6. HAVE YOU EVER DONE ANYTHING, STARTED TO DO ANYTHING, OR PREPARED TO DO ANYTHING TO END YOUR LIFE?: NO

## 2024-09-23 ASSESSMENT — PAIN SCALES - GENERAL
PAINLEVEL_OUTOF10: 0 - NO PAIN
PAIN_LEVEL: 0
PAINLEVEL_OUTOF10: 0 - NO PAIN

## 2024-09-23 ASSESSMENT — PAIN - FUNCTIONAL ASSESSMENT
PAIN_FUNCTIONAL_ASSESSMENT: 0-10

## 2024-09-23 NOTE — ANESTHESIA PREPROCEDURE EVALUATION
Patient: Jason Plata    Procedure Information       Date/Time: 09/23/24 1030    Scheduled providers: Horace Cadet MD    Procedure: COLONOSCOPY    Location: [facility]            Relevant Problems   Anesthesia (within normal limits)      Cardiac   (+) Angina, class IV (CMS-HCC)   (+) Other chest pain      Pulmonary   (+) Dyspnea on exertion      Neuro   (+) Chronic intractable headache   (+) Neuropathy of foot   (+) Panic anxiety syndrome      GI (within normal limits)      /Renal (within normal limits)      Liver (within normal limits)      Endocrine (within normal limits)      Hematology   (+) Anemia      Musculoskeletal (within normal limits)      HEENT   (+) Acute rhinosinusitis      ID   (+) COVID-19      Skin (within normal limits)      GYN (within normal limits)       Clinical information reviewed:   Tobacco  Allergies  Meds  Problems  Med Hx  Surg Hx  OB Status    Fam Hx  Soc Hx        NPO Detail:  NPO/Void Status  Date of Last Liquid: 09/23/24  Time of Last Liquid: 0915  Date of Last Solid: 09/21/24  Time of Last Solid: 1800         Physical Exam    Airway  Mallampati: II  TM distance: >3 FB  Neck ROM: full     Cardiovascular   Rhythm: regular  Rate: normal     Dental    Pulmonary   Breath sounds clear to auscultation     Abdominal   Abdomen: soft  Bowel sounds: normal           Anesthesia Plan    History of general anesthesia?: yes  History of complications of general anesthesia?: no    ASA 2     MAC     The patient is a current smoker.  Patient was previously instructed to abstain from smoking on day of procedure.  Patient smoked on day of procedure.  Education provided regarding risk of obstructive sleep apnea.  intravenous induction   Anesthetic plan and risks discussed with patient.    Plan discussed with CRNA.

## 2024-09-23 NOTE — DISCHARGE INSTRUCTIONS

## 2024-09-23 NOTE — ANESTHESIA POSTPROCEDURE EVALUATION
Patient: Jason Plata    Procedure Summary       Date: 09/23/24 Room / Location: [facility]    Anesthesia Start: 1151 Anesthesia Stop: 1212    Procedure: COLONOSCOPY Diagnosis: Screening for colon cancer    Scheduled Providers: Horace Cadet MD Responsible Provider: BREANA Merchant    Anesthesia Type: MAC ASA Status: 2            Anesthesia Type: MAC    Vitals Value Taken Time   BP 90/55 09/23/24 1211   Temp 36.4 °C (97.5 °F) 09/23/24 1211   Pulse 94 09/23/24 1211   Resp 20 09/23/24 1211   SpO2 94 % 09/23/24 1211       Anesthesia Post Evaluation    Patient location during evaluation: bedside  Patient participation: complete - patient participated  Level of consciousness: sleepy but conscious  Pain score: 0  Pain management: adequate  Airway patency: patent  Cardiovascular status: acceptable  Respiratory status: acceptable  Hydration status: acceptable  Postoperative Nausea and Vomiting: none        No notable events documented.

## 2024-09-23 NOTE — H&P
Outpatient NR Procedure H&P    Patient Profile  Name Jason Plata  Date of Birth 1980  MRN [id number]  PCP Stuart Lopez        Diagnosis: screening colon  Procedure(s)Colonoscopy       Allergies  No Known Allergies    Past Medical History   Past Medical History:   Diagnosis Date    Pain in left foot 04/24/2022    Left foot pain       Medication Reviewed - yes  Prior to Admission medications    Medication Sig Start Date End Date Taking? Authorizing Provider   aspirin 81 mg chewable tablet Chew 1 tablet (81 mg) once daily. 5/29/24 5/29/25 Yes John Cohen MD   ferrous sulfate, 325 mg ferrous sulfate, tablet Take 1 tablet by mouth once daily with breakfast. 8/19/24  Yes Stuart Lopez DO   omeprazole (PriLOSEC) 20 mg DR capsule Take 1 capsule (20 mg) by mouth once daily in the morning. Take before meals. Do not crush or chew. 8/19/24 9/23/24 Yes Stuart Lopez DO   pregabalin (Lyrica) 50 mg capsule TAKE 1 CAPSULE BY MOUTH THREE TIMES DAILY FOR 90 DAYS   Yes Historical Provider, MD   Vitamin B-12 2,500 mcg tablet, sublingual SL tablet DISSOLVE 1 TABLET ONCE DAILY 4/18/24  Yes Stuart Lopez DO   Allergy Relief, loratadine, 10 mg tablet Take 1 tablet by mouth once daily 7/8/24   Stuart Lopez DO   fluticasone (Flonase) 50 mcg/actuation nasal spray Administer 2 sprays into each nostril once daily. Shake gently. Before first use, prime pump. After use, clean tip and replace cap.  Patient not taking: Reported on 8/19/2024 4/18/24 4/18/25  Stuart Lopez DO   ibuprofen 800 mg tablet Take 1 tablet (800 mg) by mouth 2 times a day. 4/18/24   Stuart Lopez DO   topiramate (Topamax) 50 mg tablet 75 mg BID  Patient not taking: Reported on 8/13/2024 2/19/24   Santosh Pham MD   amoxicillin-pot clavulanate (Augmentin) 875-125 mg tablet Take 1 tablet by mouth every 12 hours. 8/7/24 9/23/24  Historical Provider, MD       Physical Exam  Vitals:    09/23/24 1030   BP: 123/88   Pulse: 85   Resp:  18   Temp: 36.6 °C (97.9 °F)   SpO2: 100%      Weight   Vitals:    09/23/24 1030   Weight: 71.7 kg (158 lb)     BMI Body mass index is 25.5 kg/m².    General: A&Ox3, NAD.  HEENT: AT/NC.   CV: RRR. No murmur.  Resp: CTA bilaterally. No wheezing, rhonchi or rales.   GI: Soft, NT/ND. BSx4.  Extrem: No edema. Pulses intact.  Skin: No Jaundice.   Neuro: No focal deficits.   Psych: Normal mood and affect.        Oropharyngeal Classification I (soft palate, uvula, fauces, and tonsillar pillars visible)  ASA PS Classification 2  Sedation Plan: Deep Sedation.  Procedure Plan - pre-procedural (re)assesment completed by physician:  discharge/transfer patient when discharge criteria met    Horace Cadet MD  9/23/2024 10:40 AM

## 2024-09-30 ENCOUNTER — TELEPHONE (OUTPATIENT)
Dept: GASTROENTEROLOGY | Facility: EXTERNAL LOCATION | Age: 44
End: 2024-09-30
Payer: COMMERCIAL

## 2024-09-30 LAB
LABORATORY COMMENT REPORT: NORMAL
PATH REPORT.FINAL DX SPEC: NORMAL
PATH REPORT.GROSS SPEC: NORMAL
PATH REPORT.TOTAL CANCER: NORMAL

## 2024-11-18 ENCOUNTER — APPOINTMENT (OUTPATIENT)
Dept: PRIMARY CARE | Facility: CLINIC | Age: 44
End: 2024-11-18
Payer: COMMERCIAL

## 2024-11-18 VITALS
SYSTOLIC BLOOD PRESSURE: 118 MMHG | RESPIRATION RATE: 18 BRPM | TEMPERATURE: 96.8 F | DIASTOLIC BLOOD PRESSURE: 82 MMHG | HEART RATE: 78 BPM | BODY MASS INDEX: 24.86 KG/M2 | WEIGHT: 154 LBS | OXYGEN SATURATION: 98 %

## 2024-11-18 DIAGNOSIS — N94.3 PMS (PREMENSTRUAL SYNDROME): ICD-10-CM

## 2024-11-18 DIAGNOSIS — M19.90 ARTHRITIS: ICD-10-CM

## 2024-11-18 DIAGNOSIS — E55.9 VITAMIN D DEFICIENCY: ICD-10-CM

## 2024-11-18 DIAGNOSIS — D50.8 OTHER IRON DEFICIENCY ANEMIA: ICD-10-CM

## 2024-11-18 DIAGNOSIS — M13.0 INFLAMMATION OF MULTIPLE JOINTS: ICD-10-CM

## 2024-11-18 DIAGNOSIS — K21.00 GASTROESOPHAGEAL REFLUX DISEASE WITH ESOPHAGITIS, UNSPECIFIED WHETHER HEMORRHAGE: Primary | ICD-10-CM

## 2024-11-18 PROCEDURE — 99214 OFFICE O/P EST MOD 30 MIN: CPT | Performed by: FAMILY MEDICINE

## 2024-11-18 RX ORDER — FLUOXETINE 10 MG/1
CAPSULE ORAL
Qty: 30 CAPSULE | Refills: 3 | Status: SHIPPED | OUTPATIENT
Start: 2024-11-18

## 2024-11-18 RX ORDER — IBUPROFEN 800 MG/1
800 TABLET ORAL 2 TIMES DAILY
Qty: 60 TABLET | Refills: 3 | Status: SHIPPED | OUTPATIENT
Start: 2024-11-18

## 2024-11-18 RX ORDER — OMEPRAZOLE 40 MG/1
40 CAPSULE, DELAYED RELEASE ORAL
Qty: 30 CAPSULE | Refills: 11 | Status: SHIPPED | OUTPATIENT
Start: 2024-11-18 | End: 2024-12-18

## 2024-11-18 ASSESSMENT — ENCOUNTER SYMPTOMS
NAUSEA: 0
ARTHRALGIAS: 1
ROS GI COMMENTS: REFLUX
DIARRHEA: 0

## 2024-11-18 NOTE — ASSESSMENT & PLAN NOTE
Discussed with patient her symptoms seem to be worse with certain times a day.  Will trial fluoxetine from day 15 through end of cycle

## 2024-11-18 NOTE — ASSESSMENT & PLAN NOTE
Anemia noted with ferritin borderline low side advised to continue iron treatment that is Feosol and vitamin C

## 2024-11-18 NOTE — PATIENT INSTRUCTIONS

## 2024-11-18 NOTE — PROGRESS NOTES
Subjective   Patient ID: Jason Plata is a 44 y.o. female who presents for Follow-up.  HPI  Here today states that she has noticed with change in weather she is getting a little more pain joints and generally all over body.  I reviewed notes of clinical clinic neurology [facility] for comprehensive pain recovery dated 7/24 with chief complaint at that time of generalized pain with prior history of myofascial pain/fibromyalgia/generalized pain.  At that time she had described burning pain all over her body she had tried gabapentin Cymbalta amitriptyline Topamax on these helped significantly discussed regarding initiation of Lyrica use of tizanidine for muscle spasm along with aquatic therapy for chronic pain recovery program and mindfulness activities such as alexander chi to help with the pain.  Patient here today for follow-up regarding review of above notes/pain/discomfort and .REFLUX  Patient states that at the last visit she had decrease in reflux meds and is on the8/19/24 to 1 pill daily she has noticed that with decrease in Prilosec she is now having more GERD and increase the dose back to 2 daily.  This reflux is not accompanied by difficulty swallowing hoarseness or pain on swallowing.  Review of Systems   Gastrointestinal:  Negative for diarrhea and nausea.        REFLUX   Genitourinary:  Positive for menstrual problem and vaginal bleeding.   Musculoskeletal:  Positive for arthralgias.       Objective   Physical Exam  Vitals: I have reviewed the vitals  General: Well-developed.  In no acute distress.  Eyes:   sclera nonicteric.  Conjunctiva not injected.  No discharge.   HEAD: Normocephalic, atraumatic.  HEENT   Mucous membranes moist.  Posterior oropharynx nonerythematous, no tonsillar exudates.      No cervical lymphadenopathy.  Cardio: Regular rate and rhythm.  No murmur, rub or gallop.  Pulmonary: Lungs clear to auscultation in all fields.  No accessory muscle use.  GI/: Normal active bowel  sounds.  Soft, nontender.  No masses or organomegaly appreciated.  Musculoskeletal: No gross deformities appreciated.  Neuro: Alert, age-appropriate.  Normal muscle tone.  Moving all extremities.  Skin: No rash, bruises or lesions.   Labs    Review of testing from labs dated 8/24 hemoglobin 11.0 hematocrit 34.6 platelets 433 the same date B12 satisfactory 1066 with a Ferritin 25 Iron 34 review of colonoscopy dated 9/24 shows hyperplastic polyp 1 mm x 2 with repeat colonoscopy 7 years otherwise things look good on standing    Assessment/Plan   Problem List Items Addressed This Visit       Vitamin D deficiency    Relevant Orders    Vitamin D 25-Hydroxy,Total (for eval of Vitamin D levels)    Inflammation of multiple joints     Discussed take Motrin only as needed and try Tylenol also with exercise strengthening program and check labs         Anemia     Anemia noted with ferritin borderline low side advised to continue iron treatment that is Feosol and vitamin C         Reflux esophagitis - Primary     Reflux uncontrolled increase omeprazole back to 40 mg         Relevant Medications    omeprazole (PriLOSEC) 40 mg DR capsule    PMS (premenstrual syndrome)     Discussed with patient her symptoms seem to be worse with certain times a day.  Will trial fluoxetine from day 15 through end of cycle         Relevant Medications    FLUoxetine (PROzac) 10 mg capsule    Other Relevant Orders    CBC and Auto Differential     Other Visit Diagnoses       Arthritis        Relevant Medications    ibuprofen 800 mg tablet    Other Relevant Orders    Sedimentation Rate

## 2024-11-18 NOTE — ASSESSMENT & PLAN NOTE
Discussed take Motrin only as needed and try Tylenol also with exercise strengthening program and check labs

## 2024-12-05 DIAGNOSIS — D50.8 OTHER IRON DEFICIENCY ANEMIA: ICD-10-CM

## 2024-12-05 RX ORDER — FERROUS SULFATE 325(65) MG
1 TABLET ORAL
Qty: 30 TABLET | Refills: 3 | Status: SHIPPED | OUTPATIENT
Start: 2024-12-05

## 2025-02-19 ENCOUNTER — APPOINTMENT (OUTPATIENT)
Dept: PRIMARY CARE | Facility: CLINIC | Age: 45
End: 2025-02-19
Payer: COMMERCIAL

## 2025-02-19 VITALS
RESPIRATION RATE: 18 BRPM | HEART RATE: 99 BPM | SYSTOLIC BLOOD PRESSURE: 128 MMHG | DIASTOLIC BLOOD PRESSURE: 89 MMHG | WEIGHT: 158 LBS | HEIGHT: 66 IN | TEMPERATURE: 97 F | BODY MASS INDEX: 25.39 KG/M2 | OXYGEN SATURATION: 96 %

## 2025-02-19 DIAGNOSIS — E55.9 VITAMIN D DEFICIENCY: ICD-10-CM

## 2025-02-19 DIAGNOSIS — D50.8 OTHER IRON DEFICIENCY ANEMIA: ICD-10-CM

## 2025-02-19 DIAGNOSIS — K21.01 GASTROESOPHAGEAL REFLUX DISEASE WITH ESOPHAGITIS AND HEMORRHAGE: ICD-10-CM

## 2025-02-19 DIAGNOSIS — J01.90 ACUTE RHINOSINUSITIS: ICD-10-CM

## 2025-02-19 DIAGNOSIS — M19.90 ARTHRITIS: ICD-10-CM

## 2025-02-19 DIAGNOSIS — N94.3 PMS (PREMENSTRUAL SYNDROME): ICD-10-CM

## 2025-02-19 DIAGNOSIS — E53.8 LOW SERUM VITAMIN B12: Primary | ICD-10-CM

## 2025-02-19 PROCEDURE — 99214 OFFICE O/P EST MOD 30 MIN: CPT | Performed by: FAMILY MEDICINE

## 2025-02-19 PROCEDURE — 3008F BODY MASS INDEX DOCD: CPT | Performed by: FAMILY MEDICINE

## 2025-02-19 RX ORDER — FLUTICASONE PROPIONATE 50 MCG
2 SPRAY, SUSPENSION (ML) NASAL DAILY
Qty: 16 G | Refills: 11 | Status: SHIPPED | OUTPATIENT
Start: 2025-02-19 | End: 2026-02-19

## 2025-02-19 RX ORDER — FERROUS SULFATE 325(65) MG
1 TABLET ORAL
Qty: 30 TABLET | Refills: 3 | Status: SHIPPED | OUTPATIENT
Start: 2025-02-19

## 2025-02-19 RX ORDER — CHOLECALCIFEROL (VITAMIN D3) 50 MCG
TABLET ORAL
Qty: 90 TABLET | Refills: 3 | Status: SHIPPED | OUTPATIENT
Start: 2025-02-19

## 2025-02-19 RX ORDER — IBUPROFEN 800 MG/1
800 TABLET ORAL 2 TIMES DAILY
Qty: 60 TABLET | Refills: 3 | Status: SHIPPED | OUTPATIENT
Start: 2025-02-19

## 2025-02-19 RX ORDER — TIZANIDINE 2 MG/1
TABLET ORAL
COMMUNITY
Start: 2025-02-17

## 2025-02-19 RX ORDER — FLUOXETINE 10 MG/1
CAPSULE ORAL
Qty: 30 CAPSULE | Refills: 11 | Status: SHIPPED | OUTPATIENT
Start: 2025-02-19

## 2025-02-19 RX ORDER — ACETAMINOPHEN 500 MG
2000 TABLET ORAL DAILY
Qty: 30 CAPSULE | Refills: 11 | Status: SHIPPED | OUTPATIENT
Start: 2025-02-19

## 2025-02-19 NOTE — ASSESSMENT & PLAN NOTE
Continue B12 2500 IUs daily reviewed lab testing and previous B12 8/24 was acceptable and initially to 87 we will continue supplementation

## 2025-02-19 NOTE — PROGRESS NOTES
Subjective   Patient ID: Jason Plata is a 45 y.o. female who presents for Follow-up.  HPI   The reflux esophagitis he has been occurring in a recurrent pattern for years. The course has been without change. The reflux is described as   moderate. The patient remains compliant on meds.. The patient takes medications in  daily  fashion. Denies dysphagia hoarseness or odynophagia...   Anemia taking  iron  and good energy ... .. Needs some b12 ..  Pms  tried     fluoxetine and helps from day 15 to end  Review of Systems  All other  pertinent  systems reviewed and are negative except  those  mentioned  in HPI   Objective   Physical Exam  general: alert oriented x three  HEENT hearing normal to voice  Neck supple  Lungs respirations non-labored.  Cardiovascular: no peripheral edema  Skin: warm and dry without rash  Psych: judgement and insight normal  Musculoskeletal:  ambulation normal,    lymph:negative cervical  LYMPADENOPATHY  thyroid: non palpable enlargement   Labs        Assessment/Plan   Problem List Items Addressed This Visit       Vitamin D deficiency    Relevant Medications    cholecalciferol (Vitamin D3) 50 mcg (2,000 unit) capsule    Low serum vitamin B12 - Primary     Continue B12 2500 IUs daily reviewed lab testing and previous B12 8/24 was acceptable and initially to 87 we will continue supplementation         Relevant Medications    Vitamin B-12 2,500 mcg tablet, sublingual SL tablet    Anemia     Lets go ahead and continue iron and iron fortified diet and will monitor and continue B12 2500 transfer text         Relevant Medications    ferrous sulfate, 325 mg ferrous sulfate, tablet    Other Relevant Orders    CBC and Auto Differential    Ferritin    Iron and TIBC    Acute rhinosinusitis    Relevant Medications    fluticasone (Flonase) 50 mcg/actuation nasal spray    Reflux esophagitis      The reflux esophagitis he has been occurring in a recurrent pattern for years. The course has been without  change. The reflux is described as   moderate. The patient remains compliant on meds.. The patient takes medications in  daily  fashion. Denies dysphagia hoarseness or odynophagia... Stable with treatment and advised to continue medications specifically dosage of omeprazole 40 mg                PMS (premenstrual syndrome)     Patient states with 40 mg fluoxetine PMS seems to be well-controlled and stable and continue meds         Relevant Medications    FLUoxetine (PROzac) 10 mg capsule     Other Visit Diagnoses       Arthritis        Relevant Medications    ibuprofen 800 mg tablet

## 2025-02-19 NOTE — ASSESSMENT & PLAN NOTE
Lets go ahead and continue iron and iron fortified diet and will monitor and continue B12 2500 transfer text

## 2025-02-19 NOTE — ASSESSMENT & PLAN NOTE
The reflux esophagitis he has been occurring in a recurrent pattern for years. The course has been without change. The reflux is described as   moderate. The patient remains compliant on meds.. The patient takes medications in  daily  fashion. Denies dysphagia hoarseness or odynophagia... Stable with treatment and advised to continue medications specifically dosage of omeprazole 40 mg

## 2025-05-07 ENCOUNTER — APPOINTMENT (OUTPATIENT)
Dept: NEUROLOGY | Facility: CLINIC | Age: 45
End: 2025-05-07
Payer: COMMERCIAL

## 2025-05-07 VITALS
HEART RATE: 85 BPM | DIASTOLIC BLOOD PRESSURE: 84 MMHG | TEMPERATURE: 97.8 F | WEIGHT: 160.5 LBS | HEIGHT: 66 IN | SYSTOLIC BLOOD PRESSURE: 110 MMHG | BODY MASS INDEX: 25.79 KG/M2

## 2025-05-07 DIAGNOSIS — G43.809 OTHER MIGRAINE WITHOUT STATUS MIGRAINOSUS, NOT INTRACTABLE: ICD-10-CM

## 2025-05-07 PROCEDURE — 3008F BODY MASS INDEX DOCD: CPT | Performed by: PSYCHIATRY & NEUROLOGY

## 2025-05-07 PROCEDURE — 99213 OFFICE O/P EST LOW 20 MIN: CPT | Performed by: PSYCHIATRY & NEUROLOGY

## 2025-05-07 RX ORDER — TOPIRAMATE 50 MG/1
TABLET, FILM COATED ORAL
Qty: 90 TABLET | Refills: 11 | Status: SHIPPED | OUTPATIENT
Start: 2025-05-07

## 2025-05-07 ASSESSMENT — LIFESTYLE VARIABLES
HOW OFTEN DO YOU HAVE SIX OR MORE DRINKS ON ONE OCCASION: NEVER
HOW MANY STANDARD DRINKS CONTAINING ALCOHOL DO YOU HAVE ON A TYPICAL DAY: PATIENT DOES NOT DRINK
SKIP TO QUESTIONS 9-10: 1
HOW OFTEN DO YOU HAVE A DRINK CONTAINING ALCOHOL: NEVER
AUDIT-C TOTAL SCORE: 0

## 2025-05-07 ASSESSMENT — PATIENT HEALTH QUESTIONNAIRE - PHQ9
1. LITTLE INTEREST OR PLEASURE IN DOING THINGS: NOT AT ALL
SUM OF ALL RESPONSES TO PHQ9 QUESTIONS 1 & 2: 0
2. FEELING DOWN, DEPRESSED OR HOPELESS: NOT AT ALL

## 2025-05-07 NOTE — PROGRESS NOTES
"Chief Complaint: Migraine    HPI  45 y.o. female presenting for follow up regarding migraine.    Since the last visit, she notes about 3 migraine days/week.  She describes a unilateral throbbing pain in the left temple (or in the back of her head).  She has associated photophobia, phonophobia, and nausea.  She usually takes Ibuprofen and sleeps it off.  It usually last about 8 hours.  Her migraine triggers include poor sleep.       Current Medications[1]      Objective:  /84 (BP Location: Left arm, Patient Position: Sitting, BP Cuff Size: Adult)   Pulse 85   Temp 36.6 °C (97.8 °F) (Temporal)   Ht 1.676 m (5' 6\")   Wt 72.8 kg (160 lb 8 oz)   BMI 25.91 kg/m²     Gen: NAD  Neuro:  --HIF: A&O X 3, repetition and naming intact  --CN:  PERRLA, EOMI, VFF, no visible facial asymmetry, facial sensation intact, no tongue or palatal deviation, SCM intact  --Motor: Moves all 4 extremities equally; no focal deficits  --Sensory: Intact to light touch, intact to pinprick  --Reflex: 2+ symmetric, toes down  --Cerebellum: FTN and HTS intact  --Gait: Normal, narrow based gait    Relevant Results      Assessment:    Chronic Migraine   - current migraine frequency is 3 days/week  - she ran out of TPM following the last visit    Plan:  - re-start Topamax 75 mg BID  - keep migraine diary  - follow up in 6 months    Santosh Pham MD  [facility]  Department of Neurology         [1]   Current Outpatient Medications:     Allergy Relief, loratadine, 10 mg tablet, Take 1 tablet by mouth once daily, Disp: 30 tablet, Rfl: 0    aspirin 81 mg chewable tablet, Chew 1 tablet (81 mg) once daily., Disp: 30 tablet, Rfl: 11    cholecalciferol (Vitamin D3) 50 mcg (2,000 unit) capsule, Take 1 capsule (50 mcg) by mouth once daily., Disp: 30 capsule, Rfl: 11    ferrous sulfate, 325 mg ferrous sulfate, tablet, Take 1 tablet (325 mg) by mouth once daily with breakfast., Disp: 30 tablet, Rfl: 3    FLUoxetine (PROzac) 10 mg capsule, " Take 1 p.o. daily from 12-day of cycle to end of menses, Disp: 30 capsule, Rfl: 11    fluticasone (Flonase) 50 mcg/actuation nasal spray, Administer 2 sprays into each nostril once daily. Shake gently. Before first use, prime pump. After use, clean tip and replace cap., Disp: 16 g, Rfl: 11    ibuprofen 800 mg tablet, Take 1 tablet (800 mg) by mouth 2 times a day., Disp: 60 tablet, Rfl: 3    omeprazole (PriLOSEC) 40 mg DR capsule, Take 1 capsule (40 mg) by mouth once daily in the morning. Take before meals. Do not crush or chew., Disp: 30 capsule, Rfl: 11    pregabalin (Lyrica) 50 mg capsule, TAKE 1 CAPSULE BY MOUTH THREE TIMES DAILY FOR 90 DAYS, Disp: , Rfl:     tiZANidine (Zanaflex) 2 mg tablet, , Disp: , Rfl:     topiramate (Topamax) 50 mg tablet, 75 mg BID, Disp: 90 tablet, Rfl: 3    Vitamin B-12 2,500 mcg tablet, sublingual SL tablet, DISSOLVE 1 TABLET ONCE DAILY, Disp: 90 tablet, Rfl: 3    omeprazole (PriLOSEC) 20 mg DR capsule, Take 1 capsule (20 mg) by mouth once daily in the morning. Take before meals. Do not crush or chew. (Patient not taking: Reported on 5/7/2025), Disp: 30 capsule, Rfl: 11

## 2025-05-10 LAB
BASOPHILS # BLD AUTO: 69 CELLS/UL (ref 0–200)
BASOPHILS NFR BLD AUTO: 0.6 %
EOSINOPHIL # BLD AUTO: 115 CELLS/UL (ref 15–500)
EOSINOPHIL NFR BLD AUTO: 1 %
ERYTHROCYTE [DISTWIDTH] IN BLOOD BY AUTOMATED COUNT: 12.6 % (ref 11–15)
FERRITIN SERPL-MCNC: 14 NG/ML (ref 16–232)
HCT VFR BLD AUTO: 36.8 % (ref 35–45)
HGB BLD-MCNC: 11.5 G/DL (ref 11.7–15.5)
IRON SATN MFR SERPL: 18 % (CALC) (ref 16–45)
IRON SERPL-MCNC: 71 MCG/DL (ref 40–190)
LYMPHOCYTES # BLD AUTO: 2312 CELLS/UL (ref 850–3900)
LYMPHOCYTES NFR BLD AUTO: 20.1 %
MCH RBC QN AUTO: 26.6 PG (ref 27–33)
MCHC RBC AUTO-ENTMCNC: 31.3 G/DL (ref 32–36)
MCV RBC AUTO: 85 FL (ref 80–100)
MONOCYTES # BLD AUTO: 679 CELLS/UL (ref 200–950)
MONOCYTES NFR BLD AUTO: 5.9 %
NEUTROPHILS # BLD AUTO: 8326 CELLS/UL (ref 1500–7800)
NEUTROPHILS NFR BLD AUTO: 72.4 %
PLATELET # BLD AUTO: 494 THOUSAND/UL (ref 140–400)
PMV BLD REES-ECKER: 9.3 FL (ref 7.5–12.5)
RBC # BLD AUTO: 4.33 MILLION/UL (ref 3.8–5.1)
TIBC SERPL-MCNC: 403 MCG/DL (CALC) (ref 250–450)
WBC # BLD AUTO: 11.5 THOUSAND/UL (ref 3.8–10.8)

## 2025-05-20 DIAGNOSIS — D72.828 OTHER ELEVATED WHITE BLOOD CELL (WBC) COUNT: Primary | ICD-10-CM

## 2025-05-22 DIAGNOSIS — Z12.31 ENCOUNTER FOR SCREENING MAMMOGRAM FOR BREAST CANCER: ICD-10-CM

## 2025-06-05 ENCOUNTER — APPOINTMENT (OUTPATIENT)
Dept: RADIOLOGY | Facility: CLINIC | Age: 45
End: 2025-06-05
Payer: COMMERCIAL

## 2025-06-05 ENCOUNTER — OFFICE VISIT (OUTPATIENT)
Dept: PRIMARY CARE | Facility: CLINIC | Age: 45
End: 2025-06-05
Payer: COMMERCIAL

## 2025-06-05 VITALS
HEIGHT: 63 IN | DIASTOLIC BLOOD PRESSURE: 77 MMHG | WEIGHT: 156 LBS | HEART RATE: 85 BPM | SYSTOLIC BLOOD PRESSURE: 119 MMHG | OXYGEN SATURATION: 97 % | BODY MASS INDEX: 27.64 KG/M2 | TEMPERATURE: 97 F | RESPIRATION RATE: 18 BRPM

## 2025-06-05 VITALS — HEIGHT: 65 IN | BODY MASS INDEX: 26.66 KG/M2 | WEIGHT: 160 LBS

## 2025-06-05 DIAGNOSIS — J32.3 SPHENOID SINUSITIS, UNSPECIFIED CHRONICITY: ICD-10-CM

## 2025-06-05 DIAGNOSIS — Z12.31 ENCOUNTER FOR SCREENING MAMMOGRAM FOR BREAST CANCER: ICD-10-CM

## 2025-06-05 DIAGNOSIS — B37.9 YEAST INFECTION: Primary | ICD-10-CM

## 2025-06-05 PROCEDURE — 99213 OFFICE O/P EST LOW 20 MIN: CPT | Performed by: FAMILY MEDICINE

## 2025-06-05 PROCEDURE — 77067 SCR MAMMO BI INCL CAD: CPT | Performed by: RADIOLOGY

## 2025-06-05 PROCEDURE — 77063 BREAST TOMOSYNTHESIS BI: CPT | Performed by: RADIOLOGY

## 2025-06-05 PROCEDURE — 77063 BREAST TOMOSYNTHESIS BI: CPT

## 2025-06-05 PROCEDURE — 3008F BODY MASS INDEX DOCD: CPT | Performed by: FAMILY MEDICINE

## 2025-06-05 RX ORDER — CEFDINIR 300 MG/1
300 CAPSULE ORAL 2 TIMES DAILY
Qty: 14 CAPSULE | Refills: 1 | Status: SHIPPED | OUTPATIENT
Start: 2025-06-05 | End: 2025-06-12

## 2025-06-05 RX ORDER — FLUCONAZOLE 150 MG/1
TABLET ORAL
Qty: 2 TABLET | Refills: 0 | Status: SHIPPED | OUTPATIENT
Start: 2025-06-05

## 2025-06-05 ASSESSMENT — PATIENT HEALTH QUESTIONNAIRE - PHQ9
2. FEELING DOWN, DEPRESSED OR HOPELESS: NOT AT ALL
1. LITTLE INTEREST OR PLEASURE IN DOING THINGS: NOT AT ALL
SUM OF ALL RESPONSES TO PHQ9 QUESTIONS 1 AND 2: 0

## 2025-06-05 ASSESSMENT — ENCOUNTER SYMPTOMS
SWOLLEN GLANDS: 0
SINUS PRESSURE: 1
SHORTNESS OF BREATH: 0
SORE THROAT: 0

## 2025-06-05 NOTE — PROGRESS NOTES
Subjective   Patient ID: Berstarther TRIXIE Plata is a 45 y.o. female who presents for Sinusitis (Declined swabs ).  Sinusitis  This is a recurrent problem. The current episode started 1 to 4 weeks ago. The problem has been waxing and waning since onset. There has been no fever. Her pain is at a severity of 7/10. Associated symptoms include congestion, ear pain and sinus pressure. Pertinent negatives include no shortness of breath, sore throat or swollen glands. The treatment provided no relief.       Review of Systems   HENT:  Positive for congestion, ear pain and sinus pressure. Negative for sore throat.    Respiratory:  Negative for shortness of breath.        Objective   Physical Exam  Vitals: I have reviewed the vitals  General: Well-developed.  In no acute distress.  Eyes:   sclera nonicteric.  Conjunctiva not injected.  No discharge.   HEAD: Normocephalic, atraumatic.  HEENT   Mucous membranes moist. Yelloew coryza   Posterior oropharynx nonerythematous, no tonsillar exudates.      No cervical lymphadenopathy.  Cardio: Regular rate and rhythm.  No murmur, rub or gallop.  Pulmonary: Lungs clear to auscultation in all fields.  No accessory muscle use.  GI/: Normal active bowel sounds.  Soft, nontender.  No masses or organomegaly appreciated.  Musculoskeletal: No gross deformities appreciated.  Neuro: Alert, age-appropriate.  Normal muscle tone.  Moving all extremities.  Skin: No rash, bruises or lesions.   Labs        Assessment/Plan   Problem List Items Addressed This Visit       Sphenoid sinusitis    Sinusitis  This is a recurrent problem. The current episode started 1 to 4 weeks ago. The problem has been waxing and waning since onset. There has been no fever. Her pain is at a severity of 7/10. Associated symptoms include congestion, ear pain and sinus pressure. Pertinent negatives include no shortness of breath, sore throat or swollen glands. The treatment provided no relief.  Try cefdinir and if necessary repeat  dosage          Other Visit Diagnoses         Yeast infection    -  Primary

## 2025-06-05 NOTE — ASSESSMENT & PLAN NOTE
Sinusitis  This is a recurrent problem. The current episode started 1 to 4 weeks ago. The problem has been waxing and waning since onset. There has been no fever. Her pain is at a severity of 7/10. Associated symptoms include congestion, ear pain and sinus pressure. Pertinent negatives include no shortness of breath, sore throat or swollen glands. The treatment provided no relief.  Try cefdinir and if necessary repeat dosage

## 2025-06-06 DIAGNOSIS — R92.8 ABNORMAL MAMMOGRAM OF LEFT BREAST: Primary | ICD-10-CM

## 2025-06-11 ENCOUNTER — APPOINTMENT (OUTPATIENT)
Dept: CARDIOLOGY | Facility: CLINIC | Age: 45
End: 2025-06-11
Payer: COMMERCIAL

## 2025-06-11 VITALS
WEIGHT: 154.8 LBS | HEART RATE: 82 BPM | HEIGHT: 63 IN | SYSTOLIC BLOOD PRESSURE: 122 MMHG | BODY MASS INDEX: 27.43 KG/M2 | DIASTOLIC BLOOD PRESSURE: 78 MMHG

## 2025-06-11 DIAGNOSIS — F17.200 CURRENT SMOKER ON SOME DAYS: ICD-10-CM

## 2025-06-11 DIAGNOSIS — R55 VASOVAGAL SYNCOPE: ICD-10-CM

## 2025-06-11 DIAGNOSIS — R07.89 OTHER CHEST PAIN: ICD-10-CM

## 2025-06-11 DIAGNOSIS — R00.2 PALPITATIONS: ICD-10-CM

## 2025-06-11 DIAGNOSIS — K21.00 GASTROESOPHAGEAL REFLUX DISEASE WITH ESOPHAGITIS, UNSPECIFIED WHETHER HEMORRHAGE: ICD-10-CM

## 2025-06-11 DIAGNOSIS — Z82.49 FAMILY HISTORY OF ISCHEMIC HEART DISEASE: ICD-10-CM

## 2025-06-11 PROCEDURE — 3008F BODY MASS INDEX DOCD: CPT | Performed by: INTERNAL MEDICINE

## 2025-06-11 PROCEDURE — 99213 OFFICE O/P EST LOW 20 MIN: CPT | Performed by: INTERNAL MEDICINE

## 2025-06-11 NOTE — PROGRESS NOTES
CARDIOLOGY OFFICE VISIT      CHIEF COMPLAINT  Chief Complaint   Patient presents with    Follow-up     1 year follow up on palpitations, dyspnea on exertion, and vasovagal syncope management        HISTORY OF PRESENT ILLNESS  The patient states that she has been doing very well.  She is not having any chest discomfort.  She denies dyspnea exertion.  She denies prolonged palpitations, presyncope or syncope.      Impression:  Angiographically normal coronary arteries June 2020,  Palpitations  History of 1 remote syncopal episode, sounds most likely secondary to postural hypotension  History of gastroesophageal reflux  History of fibromyalgia  Tobacco abuse  Positive family history of myocardial infarction  Positive family history of cardiac surgery, exact type not known     Please excuse any errors in grammar or translation related to this dictation.  Voice recognition software was utilized to prepare this document.      Past Medical History  Medical History[1]    Social History  Social History[2]    Family History   Family History[3]     Allergies:  RX Allergies[4]     Outpatient Medications:  Current Outpatient Medications   Medication Instructions    Allergy Relief (loratadine) 10 mg, oral, Daily    cefdinir (OMNICEF) 300 mg, oral, 2 times daily    cholecalciferol (VITAMIN D3) 50 mcg, oral, Daily    ferrous sulfate 325 mg, oral, Daily with breakfast    fluconazole (Diflucan) 150 mg tablet 1 p.o..x  one and if 72 hours later symptoms remain please repeat dosage    FLUoxetine (PROzac) 10 mg capsule Take 1 p.o. daily from 12-day of cycle to end of menses    fluticasone (Flonase) 50 mcg/actuation nasal spray 2 sprays, Each Nostril, Daily, Shake gently. Before first use, prime pump. After use, clean tip and replace cap.    ibuprofen 800 mg, oral, 2 times daily    omeprazole (PRILOSEC) 20 mg, oral, Daily before breakfast, Do not crush or chew.    omeprazole (PRILOSEC) 40 mg, oral, Daily before breakfast, Do not crush or  chew.    pregabalin (Lyrica) 50 mg capsule TAKE 1 CAPSULE BY MOUTH THREE TIMES DAILY FOR 90 DAYS    tiZANidine (Zanaflex) 2 mg tablet     topiramate (Topamax) 50 mg tablet 75 mg BID    Vitamin B-12 2,500 mcg tablet, sublingual SL tablet DISSOLVE 1 TABLET ONCE DAILY          REVIEW OF SYSTEMS  Review of Systems   All other systems reviewed and are negative.        VITALS  Vitals:    06/11/25 1007   BP: 122/78   Pulse: 82       PHYSICAL EXAM  Vitals and nursing note reviewed.   Constitutional:       Appearance: Healthy appearance.   Eyes:      Conjunctiva/sclera: Conjunctivae normal.      Pupils: Pupils are equal, round, and reactive to light.   Pulmonary:      Effort: Pulmonary effort is normal.      Breath sounds: Normal breath sounds.   Cardiovascular:      PMI at left midclavicular line. Normal rate. Regular rhythm.      Murmurs: There is no murmur.      No gallop.  No click. No rub.   Pulses:     Intact distal pulses.   Edema:     Peripheral edema absent.   Musculoskeletal: Normal range of motion. Skin:     General: Skin is warm and dry.   Neurological:      Mental Status: Alert and oriented to person, place and time.           ASSESSMENT AND PLAN  Diagnoses and all orders for this visit:  Palpitations  Family history of ischemic heart disease  Gastroesophageal reflux disease with esophagitis, unspecified whether hemorrhage  Current smoker on some days  Vasovagal syncope  Other chest pain  BMI 27.0-27.9,adult      [unfilled]      HERMAN,Marta Hanks LPN am scribing for, and in the presence of Dr. John Cohen.    I, Dr. John Cohen, personally performed the services described in the documentation as scribed by Marta Hanks LPN in my presence, and confirm it is both accurate and complete.      Dr. John Summers MD  Thank you for allowing me to participate in the care of this patient. Please do not hesitate to contact me with any further questions or concerns.          [1]   Past Medical History:  Diagnosis Date    LÓPEZ (dyspnea on exertion)     Pain in left foot 04/24/2022    Left foot pain    Palpitations    [2]   Social History  Tobacco Use    Smoking status: Some Days     Current packs/day: 0.50     Types: Cigarettes     Passive exposure: Current    Smokeless tobacco: Never   Vaping Use    Vaping status: Never Used   Substance Use Topics    Alcohol use: Not Currently    Drug use: Never   [3]   Family History  Problem Relation Name Age of Onset    Diabetes Father     [4] No Known Allergies

## 2025-07-14 ENCOUNTER — TELEPHONE (OUTPATIENT)
Dept: PRIMARY CARE | Facility: CLINIC | Age: 45
End: 2025-07-14

## 2025-07-14 ENCOUNTER — OFFICE VISIT (OUTPATIENT)
Dept: PRIMARY CARE | Facility: CLINIC | Age: 45
End: 2025-07-14
Payer: COMMERCIAL

## 2025-07-14 VITALS
WEIGHT: 156 LBS | HEART RATE: 88 BPM | BODY MASS INDEX: 27.64 KG/M2 | HEIGHT: 63 IN | DIASTOLIC BLOOD PRESSURE: 84 MMHG | OXYGEN SATURATION: 98 % | RESPIRATION RATE: 18 BRPM | SYSTOLIC BLOOD PRESSURE: 128 MMHG | TEMPERATURE: 97 F

## 2025-07-14 DIAGNOSIS — D50.8 OTHER IRON DEFICIENCY ANEMIA: ICD-10-CM

## 2025-07-14 DIAGNOSIS — R55 VASOVAGAL SYNCOPE: Primary | ICD-10-CM

## 2025-07-14 DIAGNOSIS — K21.00 GASTROESOPHAGEAL REFLUX DISEASE WITH ESOPHAGITIS, UNSPECIFIED WHETHER HEMORRHAGE: ICD-10-CM

## 2025-07-14 PROCEDURE — 3008F BODY MASS INDEX DOCD: CPT | Performed by: FAMILY MEDICINE

## 2025-07-14 PROCEDURE — 93000 ELECTROCARDIOGRAM COMPLETE: CPT | Performed by: FAMILY MEDICINE

## 2025-07-14 PROCEDURE — 99214 OFFICE O/P EST MOD 30 MIN: CPT | Performed by: FAMILY MEDICINE

## 2025-07-14 RX ORDER — LANOLIN ALCOHOL/MO/W.PET/CERES
400 CREAM (GRAM) TOPICAL DAILY
Qty: 90 TABLET | Refills: 3 | Status: SHIPPED | OUTPATIENT
Start: 2025-07-14 | End: 2026-07-14

## 2025-07-14 RX ORDER — FERROUS SULFATE 325(65) MG
1 TABLET ORAL
Qty: 30 TABLET | Refills: 3 | Status: SHIPPED | OUTPATIENT
Start: 2025-07-14

## 2025-07-14 RX ORDER — OMEPRAZOLE 40 MG/1
40 CAPSULE, DELAYED RELEASE ORAL
Qty: 90 CAPSULE | Refills: 3 | Status: SHIPPED | OUTPATIENT
Start: 2025-07-14 | End: 2026-07-09

## 2025-07-14 ASSESSMENT — ENCOUNTER SYMPTOMS
LIGHT-HEADEDNESS: 1
SYNCOPE: 1
BOWEL INCONTINENCE: 0
FEVER: 0
DIAPHORESIS: 1
DIZZINESS: 1

## 2025-07-14 ASSESSMENT — PATIENT HEALTH QUESTIONNAIRE - PHQ9
SUM OF ALL RESPONSES TO PHQ9 QUESTIONS 1 AND 2: 0
1. LITTLE INTEREST OR PLEASURE IN DOING THINGS: NOT AT ALL
2. FEELING DOWN, DEPRESSED OR HOPELESS: NOT AT ALL

## 2025-07-14 NOTE — ASSESSMENT & PLAN NOTE
Review of GERD and history indicates patient had vasovagal syncope.  At the time this occurred she felt warm like fever slightly diaphoretic and after changing position had his friend telling her to stand up she suddenly passed out without biting tongue or urinary incontinence.  She has had similar spells in the past.  She admits to being a poor water drinker.  Does drink a lot of tea and tree   .  Results of EKG shows normal sinus rhythm poor R wave progression nonspecific ST segment changes with ..  Review of previous cardiology notes dated 5/24 with result of echo at that time showing normal ejection fraction and no mitral or tricuspid or aortic valve stenosis present at that time.  With cardiac catheterization showing 9 cardiac catheterization showing results indicating 0% stenosis with normal ejection fraction.  Had long discussion with patient advised to consider changing to water that is 60 ounces daily and handout given on syncope

## 2025-07-14 NOTE — PROGRESS NOTES
Subjective   Patient ID: Bernohemy Plata is a 45 y.o. female who presents for Syncope.  Syncope  This is a recurrent problem. The current episode started yesterday. The problem occurs intermittently. The problem has been waxing and waning. She lost consciousness for a period of 1 to 5 minutes. The symptoms are aggravated by standing and exertion. Associated symptoms include diaphoresis, dizziness, light-headedness and malaise/fatigue. Pertinent negatives include no bladder incontinence, bowel incontinence or fever. Associated symptoms comments: No biting gtongue. The treatment provided moderate relief.       Review of Systems   Constitutional:  Positive for diaphoresis and malaise/fatigue. Negative for fever.   Cardiovascular:  Positive for syncope.   Gastrointestinal:  Negative for bowel incontinence.   Genitourinary:  Negative for bladder incontinence.   Neurological:  Positive for dizziness and light-headedness.       Objective   Physical Exam  Vitals: I have reviewed the vitals  General: Well-developed.  In no acute distress.  Eyes:   sclera nonicteric.  Conjunctiva not injected.  No discharge.   HEAD: Normocephalic, atraumatic.  HEENT   Mucous membranes moist.  Posterior oropharynx nonerythematous, no tonsillar exudates.      No cervical lymphadenopathy.  Cardio: Regular rate and rhythm.  No murmur, rub or gallop.  Pulmonary: Lungs clear to auscultation in all fields.  No accessory muscle use.  GI/: Normal active bowel sounds.  Soft, nontender.  No masses or organomegaly appreciated.  Musculoskeletal: No gross deformities appreciated.  Neuro: Alert, age-appropriate.  Normal muscle tone.  Moving all extremities.  Skin: No rash, bruises or lesions. b  Labs    EKG showed normal sinus rhythm  Axis..  Normal  Poor R wave progression  Nonspecific ST segment changes  In comparison with EKG 10/21 similar without significant change    Assessment/Plan   Problem List Items Addressed This Visit       Anemia    Relevant  Medications    ferrous sulfate 325 mg (65 mg elemental) tablet    Reflux esophagitis    Relevant Medications    omeprazole (PriLOSEC) 40 mg DR capsule    Other Relevant Orders    CBC and Auto Differential    Magnesium    Vasovagal syncope - Primary    Review of GERD and history indicates patient had vasovagal syncope.  At the time this occurred she felt warm like fever slightly diaphoretic and after changing position had his friend telling her to stand up she suddenly passed out without biting tongue or urinary incontinence.  She has had similar spells in the past.  She admits to being a poor water drinker.  Does drink a lot of tea and tree   .  Results of EKG shows normal sinus rhythm poor R wave progression nonspecific ST segment changes with ..  Review of previous cardiology notes dated 5/24 with result of echo at that time showing normal ejection fraction and no mitral or tricuspid or aortic valve stenosis present at that time.  With cardiac catheterization showing 9 cardiac catheterization showing results indicating 0% stenosis with normal ejection fraction.  Had long discussion with patient advised to consider changing to water that is 60 ounces daily and handout given on syncope         Relevant Medications    magnesium oxide (Mag-Ox) 400 mg (241.3 mg elemental) tablet    Other Relevant Orders    Basic Metabolic Panel    ECG 12 Lead

## 2025-07-14 NOTE — LETTER
July 14, 2025     Patient: Jason Plata   YOB: 1980   Date of Visit: 7/14/2025       To Whom It May Concern:    Jason Plata was seen in my clinic on 7/14/2025. Please excuse Jason for her absence from work on this day to make the appointment.    If you have any questions or concerns, please don't hesitate to call.         Sincerely,         Stuart Lopez, DO

## 2025-08-10 LAB
ANION GAP SERPL CALCULATED.4IONS-SCNC: 9 MMOL/L (CALC) (ref 7–17)
BASOPHILS # BLD AUTO: 61 CELLS/UL (ref 0–200)
BASOPHILS NFR BLD AUTO: 0.7 %
BUN SERPL-MCNC: 9 MG/DL (ref 7–25)
BUN/CREAT SERPL: NORMAL (CALC) (ref 6–22)
CALCIUM SERPL-MCNC: 9.8 MG/DL (ref 8.6–10.2)
CHLORIDE SERPL-SCNC: 104 MMOL/L (ref 98–110)
CO2 SERPL-SCNC: 23 MMOL/L (ref 20–32)
CREAT SERPL-MCNC: 0.9 MG/DL (ref 0.5–0.99)
EGFRCR SERPLBLD CKD-EPI 2021: 80 ML/MIN/1.73M2
EOSINOPHIL # BLD AUTO: 165 CELLS/UL (ref 15–500)
EOSINOPHIL NFR BLD AUTO: 1.9 %
ERYTHROCYTE [DISTWIDTH] IN BLOOD BY AUTOMATED COUNT: 13.1 % (ref 11–15)
GLUCOSE SERPL-MCNC: 91 MG/DL (ref 65–139)
HCT VFR BLD AUTO: 39.6 % (ref 35–45)
HGB BLD-MCNC: 12.4 G/DL (ref 11.7–15.5)
LYMPHOCYTES # BLD AUTO: 2306 CELLS/UL (ref 850–3900)
LYMPHOCYTES NFR BLD AUTO: 26.5 %
MAGNESIUM SERPL-MCNC: 2.2 MG/DL (ref 1.5–2.5)
MCH RBC QN AUTO: 26.7 PG (ref 27–33)
MCHC RBC AUTO-ENTMCNC: 31.3 G/DL (ref 32–36)
MCV RBC AUTO: 85.2 FL (ref 80–100)
MONOCYTES # BLD AUTO: 670 CELLS/UL (ref 200–950)
MONOCYTES NFR BLD AUTO: 7.7 %
NEUTROPHILS # BLD AUTO: 5498 CELLS/UL (ref 1500–7800)
NEUTROPHILS NFR BLD AUTO: 63.2 %
PLATELET # BLD AUTO: 485 THOUSAND/UL (ref 140–400)
PMV BLD REES-ECKER: 9.3 FL (ref 7.5–12.5)
POTASSIUM SERPL-SCNC: 4.3 MMOL/L (ref 3.5–5.3)
RBC # BLD AUTO: 4.65 MILLION/UL (ref 3.8–5.1)
SODIUM SERPL-SCNC: 136 MMOL/L (ref 135–146)
WBC # BLD AUTO: 8.7 THOUSAND/UL (ref 3.8–10.8)

## 2025-08-14 ENCOUNTER — APPOINTMENT (OUTPATIENT)
Dept: PRIMARY CARE | Facility: CLINIC | Age: 45
End: 2025-08-14
Payer: COMMERCIAL

## 2025-08-20 ENCOUNTER — APPOINTMENT (OUTPATIENT)
Dept: PRIMARY CARE | Facility: CLINIC | Age: 45
End: 2025-08-20
Payer: COMMERCIAL

## 2025-08-20 ENCOUNTER — OFFICE VISIT (OUTPATIENT)
Dept: PRIMARY CARE | Facility: CLINIC | Age: 45
End: 2025-08-20
Payer: COMMERCIAL

## 2025-08-20 VITALS
BODY MASS INDEX: 27.36 KG/M2 | WEIGHT: 154.4 LBS | TEMPERATURE: 97 F | OXYGEN SATURATION: 99 % | HEART RATE: 91 BPM | HEIGHT: 63 IN | RESPIRATION RATE: 18 BRPM | SYSTOLIC BLOOD PRESSURE: 119 MMHG | DIASTOLIC BLOOD PRESSURE: 75 MMHG

## 2025-08-20 DIAGNOSIS — J02.9 PHARYNGITIS, UNSPECIFIED ETIOLOGY: ICD-10-CM

## 2025-08-20 DIAGNOSIS — K21.00 GASTROESOPHAGEAL REFLUX DISEASE WITH ESOPHAGITIS, UNSPECIFIED WHETHER HEMORRHAGE: ICD-10-CM

## 2025-08-20 DIAGNOSIS — R55 VASOVAGAL SYNCOPE: ICD-10-CM

## 2025-08-20 DIAGNOSIS — D75.839 THROMBOCYTOSIS: Primary | ICD-10-CM

## 2025-08-20 LAB — POC RAPID STREP: NEGATIVE

## 2025-08-20 PROCEDURE — 3008F BODY MASS INDEX DOCD: CPT | Performed by: FAMILY MEDICINE

## 2025-08-20 PROCEDURE — 99214 OFFICE O/P EST MOD 30 MIN: CPT | Performed by: FAMILY MEDICINE

## 2025-08-20 PROCEDURE — 87880 STREP A ASSAY W/OPTIC: CPT | Performed by: FAMILY MEDICINE

## 2025-08-20 RX ORDER — OMEPRAZOLE 40 MG/1
40 CAPSULE, DELAYED RELEASE ORAL
Qty: 90 CAPSULE | Refills: 3 | Status: SHIPPED | OUTPATIENT
Start: 2025-08-20 | End: 2026-08-15

## 2025-08-20 RX ORDER — AZITHROMYCIN 250 MG/1
TABLET, FILM COATED ORAL
Qty: 6 TABLET | Refills: 0 | Status: SHIPPED | OUTPATIENT
Start: 2025-08-20 | End: 2025-08-25

## 2025-08-20 ASSESSMENT — ENCOUNTER SYMPTOMS
SORE THROAT: 1
DYSURIA: 0
BACK PAIN: 1
COUGH: 1

## 2025-08-20 ASSESSMENT — PATIENT HEALTH QUESTIONNAIRE - PHQ9
1. LITTLE INTEREST OR PLEASURE IN DOING THINGS: NOT AT ALL
2. FEELING DOWN, DEPRESSED OR HOPELESS: NOT AT ALL
SUM OF ALL RESPONSES TO PHQ9 QUESTIONS 1 AND 2: 0

## 2025-08-21 DIAGNOSIS — B37.9 YEAST INFECTION: ICD-10-CM

## 2025-08-21 RX ORDER — TERCONAZOLE 80 MG/1
80 SUPPOSITORY VAGINAL NIGHTLY
Qty: 3 SUPPOSITORY | Refills: 1 | Status: SHIPPED | OUTPATIENT
Start: 2025-08-21 | End: 2025-08-24

## 2025-11-13 ENCOUNTER — APPOINTMENT (OUTPATIENT)
Dept: NEUROLOGY | Facility: CLINIC | Age: 45
End: 2025-11-13
Payer: COMMERCIAL

## 2025-11-26 ENCOUNTER — APPOINTMENT (OUTPATIENT)
Dept: PRIMARY CARE | Facility: CLINIC | Age: 45
End: 2025-11-26
Payer: COMMERCIAL

## 2026-06-12 ENCOUNTER — APPOINTMENT (OUTPATIENT)
Dept: CARDIOLOGY | Facility: CLINIC | Age: 46
End: 2026-06-12
Payer: COMMERCIAL

## (undated) DEVICE — SYRINGE, ANGIOGRAPHIC, MULTIDOSE

## (undated) DEVICE — BANDAGE, QUIKCLOT, INTERVENTIONAL HEMO, W/O SLIT

## (undated) DEVICE — CLOSURE SYSTEM, VASCULAR, VASCADE, 5 F

## (undated) DEVICE — TUBING, MANIFOLD, LOW PRESSURE

## (undated) DEVICE — CATHETER, INFINITI DIAGNOSTIC, 5 FR 100CM 3DRC, WILLIAMS RIGHT OR NO TORQUE

## (undated) DEVICE — CATHETER, DIAGNOSTIC, 5FR,  PIG-145, 110CM, 6SH ANGLED

## (undated) DEVICE — CATHETER, DIAGNOSTIC, JUDKINS, LEFT, 5 FR-JL 4.0

## (undated) DEVICE — SHEATH, PINNACLE, W/.038 GW 10CM, 5FR INTRODUCER, 2.5 CM DIALATOR